# Patient Record
Sex: MALE | Race: OTHER | Employment: FULL TIME | URBAN - METROPOLITAN AREA
[De-identification: names, ages, dates, MRNs, and addresses within clinical notes are randomized per-mention and may not be internally consistent; named-entity substitution may affect disease eponyms.]

---

## 2017-01-27 PROBLEM — I51.7 CARDIOMEGALY: Status: ACTIVE | Noted: 2017-01-27

## 2017-01-27 RX ORDER — CHLORAL HYDRATE 500 MG
CAPSULE ORAL
COMMUNITY
Start: 2014-01-27

## 2017-01-27 RX ORDER — ALBUTEROL SULFATE 90 UG/1
AEROSOL, METERED RESPIRATORY (INHALATION)
COMMUNITY
Start: 2013-09-04 | End: 2017-08-08

## 2017-01-27 RX ORDER — ESOMEPRAZOLE MAGNESIUM 40 MG/1
CAPSULE, DELAYED RELEASE ORAL
COMMUNITY
Start: 2016-08-15 | End: 2020-07-24

## 2017-01-27 RX ORDER — ALPHA LIPOIC ACID 200 MG
CAPSULE ORAL
COMMUNITY
Start: 2013-09-13 | End: 2018-04-06

## 2017-01-27 RX ORDER — RANITIDINE 150 MG/1
TABLET ORAL
COMMUNITY
Start: 2016-08-01 | End: 2017-04-27

## 2017-01-27 RX ORDER — ESZOPICLONE 3 MG/1
TABLET, FILM COATED ORAL
COMMUNITY
Start: 2016-10-14 | End: 2017-01-27

## 2017-01-27 RX ORDER — ESZOPICLONE 3 MG/1
3 TABLET, FILM COATED ORAL NIGHTLY
Qty: 30 TABLET | Refills: 6 | Status: SHIPPED | OUTPATIENT
Start: 2017-01-27 | End: 2017-01-31

## 2017-01-30 NOTE — TELEPHONE ENCOUNTER
Unsure which med needs refilled. Left message for patient to call office.  James Wilkins, 01/30/2017, 5:18 PM

## 2017-01-31 NOTE — TELEPHONE ENCOUNTER
Patient needs refill of Lunesta to [facility]. Patient had px done 11/8/16 as well as labs.  Sonali Wilkins, 01/31/2017, 5:30 PM

## 2017-02-01 RX ORDER — ESZOPICLONE 3 MG/1
3 TABLET, FILM COATED ORAL NIGHTLY
Qty: 30 TABLET | Refills: 5 | Status: SHIPPED
Start: 2017-02-01 | End: 2017-05-09

## 2017-02-27 ENCOUNTER — OFFICE VISIT (OUTPATIENT)
Dept: FAMILY MEDICINE CLINIC | Facility: CLINIC | Age: 52
End: 2017-02-27

## 2017-02-27 ENCOUNTER — HOSPITAL ENCOUNTER (OUTPATIENT)
Dept: GENERAL RADIOLOGY | Age: 52
Discharge: HOME | End: 2017-02-27
Attending: NURSE PRACTITIONER
Payer: COMMERCIAL

## 2017-02-27 ENCOUNTER — TELEPHONE (OUTPATIENT)
Dept: FAMILY MEDICINE CLINIC | Facility: CLINIC | Age: 52
End: 2017-02-27

## 2017-02-27 VITALS
TEMPERATURE: 98 F | SYSTOLIC BLOOD PRESSURE: 122 MMHG | DIASTOLIC BLOOD PRESSURE: 78 MMHG | HEIGHT: 67 IN | WEIGHT: 210.19 LBS | HEART RATE: 60 BPM | BODY MASS INDEX: 32.99 KG/M2

## 2017-02-27 DIAGNOSIS — M25.532 WRIST PAIN, CHRONIC, LEFT: ICD-10-CM

## 2017-02-27 DIAGNOSIS — M25.532 WRIST PAIN, CHRONIC, LEFT: Primary | ICD-10-CM

## 2017-02-27 DIAGNOSIS — G89.29 WRIST PAIN, CHRONIC, LEFT: Primary | ICD-10-CM

## 2017-02-27 DIAGNOSIS — G89.29 WRIST PAIN, CHRONIC, LEFT: ICD-10-CM

## 2017-02-27 PROCEDURE — 73110 X-RAY EXAM OF WRIST: CPT

## 2017-02-27 PROCEDURE — 99213 OFFICE O/P EST LOW 20 MIN: CPT | Performed by: NURSE PRACTITIONER

## 2017-02-27 NOTE — PATIENT INSTRUCTIONS
Wrist Extension (Strength)     This exercise is written for your right elbow. Switch sides for your left elbow. 1. Sit in a chair. Hold a hand weight in your right hand. Your healthcare provider will tell you what size of hand weight to use.   2. Lean yo © 0945-7488 [address], [address] [place]. All rights reserved. This information is not intended as a substitute for professional medical care. Always follow your healthcare professional's instructions.         Wrist F

## 2017-02-27 NOTE — PROGRESS NOTES
HPI:    Patient ID: Alexandra Gabriel is a 45year old male. HPI    Pain to the left wrist that started 4 months ago after punching a punching bag. No swelling. States has had the problem most of his life - this time not going away.  Has seen ortho in prescriptions requested or ordered in this encounter    Imaging & Referrals:  None       #[id number]

## 2017-02-27 NOTE — TELEPHONE ENCOUNTER
----- Message from [facility], KIRSTEN sent at 2/27/2017  3:05 PM CST -----  X-ray shows possible increase in joint space in the wrist area. Recommend to try the exercises as discussed. If not improving would recommend an MRI with referral to ortho.

## 2017-03-09 ENCOUNTER — TELEPHONE (OUTPATIENT)
Dept: FAMILY MEDICINE CLINIC | Facility: CLINIC | Age: 52
End: 2017-03-09

## 2017-03-13 ENCOUNTER — TELEPHONE (OUTPATIENT)
Dept: FAMILY MEDICINE CLINIC | Facility: CLINIC | Age: 52
End: 2017-03-13

## 2017-03-13 DIAGNOSIS — M25.532 CHRONIC WRIST PAIN, LEFT: Primary | ICD-10-CM

## 2017-03-13 DIAGNOSIS — G89.29 CHRONIC WRIST PAIN, LEFT: Primary | ICD-10-CM

## 2017-03-13 NOTE — TELEPHONE ENCOUNTER
Referral amended per Lazarus Barnes at [facility] initialed changes as Dr. Jerome Goes on vacation  Referral faxed back to Winferd Oddi Minor, 03/13/2017, 12:57 PM

## 2017-03-13 NOTE — TELEPHONE ENCOUNTER
Patient saw [facility] on 2/27/17 for ongoing wrist pain  Patient states that he has been doing the PT exercises and stretches that Laurel gave him to do  States pain is not better  States he was told to call back if not better and Laurel would order MRI of the w

## 2017-03-13 NOTE — TELEPHONE ENCOUNTER
Spoke to [facility] at [employer]  Referral for oral appliance needs to be amended  She will fax over copy of referral (was done in [employer])  Need to add code  to referral for insurance    Will await form    Terrilyn Meigs Minor, 03/13/2017, 10:15

## 2017-03-20 ENCOUNTER — TELEPHONE (OUTPATIENT)
Dept: FAMILY MEDICINE CLINIC | Facility: CLINIC | Age: 52
End: 2017-03-20

## 2017-03-20 NOTE — TELEPHONE ENCOUNTER
Patient states he never heard back regarding MRI of hand  See phone note 3/13/17 regarding this  Laurel Olivares put order into chart for MRI  Unsure about status of prior authorization  Faxed order to [facility] patient scheduling  Patient has phone number to ca

## 2017-04-27 ENCOUNTER — OFFICE VISIT (OUTPATIENT)
Dept: FAMILY MEDICINE CLINIC | Facility: CLINIC | Age: 52
End: 2017-04-27

## 2017-04-27 VITALS
BODY MASS INDEX: 32.1 KG/M2 | WEIGHT: 211.81 LBS | SYSTOLIC BLOOD PRESSURE: 136 MMHG | DIASTOLIC BLOOD PRESSURE: 74 MMHG | TEMPERATURE: 97 F | HEART RATE: 60 BPM | RESPIRATION RATE: 16 BRPM | HEIGHT: 68 IN

## 2017-04-27 DIAGNOSIS — H92.01 OTALGIA, RIGHT: Primary | ICD-10-CM

## 2017-04-27 DIAGNOSIS — M79.641 PAIN OF RIGHT HAND: ICD-10-CM

## 2017-04-27 PROCEDURE — 99214 OFFICE O/P EST MOD 30 MIN: CPT | Performed by: FAMILY MEDICINE

## 2017-04-27 RX ORDER — RANITIDINE 150 MG/1
150 CAPSULE ORAL DAILY
COMMUNITY
Start: 2017-04-17 | End: 2020-07-02

## 2017-04-27 NOTE — PROGRESS NOTES
[facility] [place]  PROGRESS NOTE        HPI:   This is a 45year old male coming in for Patient presents with:  Ear Pain: Left ear has been hurting 3 days  Hand Pain: Right hand, middle and ring finger- has been hurting for 5 days    HPI    Pt Answered         Problem List:  Patient Active Problem List:     Routine general medical examination at a health care facility     Allergic rhinitis     Asthma     Low back pain     Benign enlargement of prostate     Disorder of lipid metabolism     Physic ear pain and hand pain. Diagnoses and all orders for this visit:    Otalgia, left  discussed tenderness at TMJ , needs to see DR Adriana Dawson for adjustment of oral appliance. Use CPAP x 3 nights and see if pain resolves.  And call Dami's office for evalu

## 2017-04-28 ENCOUNTER — TELEPHONE (OUTPATIENT)
Dept: FAMILY MEDICINE CLINIC | Facility: CLINIC | Age: 52
End: 2017-04-28

## 2017-04-28 NOTE — TELEPHONE ENCOUNTER
Unable to find anything in OV notes from yesterday  Was patient supposed to start using a nasal spray?   Please advise    June Wilkins, 04/28/2017, 1:26 PM

## 2017-04-28 NOTE — TELEPHONE ENCOUNTER
Patient notified of recommendations and expressed understanding.     Walter Wilkins, 04/28/2017, 2:31 PM

## 2017-05-09 ENCOUNTER — TELEPHONE (OUTPATIENT)
Dept: FAMILY MEDICINE CLINIC | Facility: CLINIC | Age: 52
End: 2017-05-09

## 2017-05-10 RX ORDER — ESZOPICLONE 3 MG/1
3 TABLET, FILM COATED ORAL NIGHTLY
Qty: 30 TABLET | Refills: 0 | Status: SHIPPED
Start: 2017-05-10 | End: 2017-06-09

## 2017-05-10 NOTE — TELEPHONE ENCOUNTER
Laurel Olivares printed and signed script  Script faxed  Patient notified    Sayra Wilkins, 05/10/2017, 11:32 AM

## 2017-06-09 RX ORDER — ESZOPICLONE 3 MG/1
TABLET, FILM COATED ORAL
Qty: 30 TABLET | Refills: 2 | Status: SHIPPED
Start: 2017-06-09 | End: 2018-04-06

## 2017-06-09 NOTE — TELEPHONE ENCOUNTER
4/27/17 last OV with Dr. Didier Lopez  Was not originally for sleep but sleep was discussed    June Wilkins, 06/09/2017, 3:32 PM

## 2017-06-09 NOTE — TELEPHONE ENCOUNTER
Script printed, signed, and faxed ([facility])    Sonali Wilkins, 06/09/2017, 3:44 PM

## 2017-07-18 ENCOUNTER — OFFICE VISIT (OUTPATIENT)
Dept: FAMILY MEDICINE CLINIC | Facility: CLINIC | Age: 52
End: 2017-07-18

## 2017-07-18 VITALS
TEMPERATURE: 98 F | WEIGHT: 210.63 LBS | SYSTOLIC BLOOD PRESSURE: 122 MMHG | BODY MASS INDEX: 31.92 KG/M2 | HEIGHT: 68 IN | HEART RATE: 58 BPM | DIASTOLIC BLOOD PRESSURE: 76 MMHG | RESPIRATION RATE: 16 BRPM

## 2017-07-18 DIAGNOSIS — G47.33 OSA (OBSTRUCTIVE SLEEP APNEA): Primary | ICD-10-CM

## 2017-07-18 DIAGNOSIS — G47.09 OTHER INSOMNIA: ICD-10-CM

## 2017-07-18 DIAGNOSIS — G47.33 OBSTRUCTIVE SLEEP APNEA: ICD-10-CM

## 2017-07-18 DIAGNOSIS — G47.10 HYPERSOMNIA: ICD-10-CM

## 2017-07-18 PROCEDURE — 99214 OFFICE O/P EST MOD 30 MIN: CPT | Performed by: FAMILY MEDICINE

## 2017-07-18 NOTE — PROGRESS NOTES
[facility]  [facility]        HPI:   This is a 47year old male coming in for Patient presents with:  Obstructive Sleep Apnea (CRYSTAL): Sleep Follow up      HPI:  Has been using oral appliance and didn't feel comfortable with cpap. FSS 21         Past Medical History:   Diagnosis Date   • Asthma    • Sleep apnea      Past Surgical History:  No date: OTHER      Comment: L shoulder   No date: REPAIR, SLAP LESION      Comment: Left shoulder  No date: SINUS SURGERY    Social History: Respiratory: Positive for apnea. Cardiovascular: Negative. Neurological:        RLS scale: 0   Psychiatric/Behavioral: Positive for sleep disturbance.        EXAM:   /76 (BP Location: Left arm, Patient Position: Sitting, Cuff Size: large)   Pu psg with appliance. 2. Hypersomnia  - SLEEP MEDICINE - EXTERNAL    3. Other insomnia    Recommend staying in one time zone x 3 months. Recommend CBT-I for sleep. [facility]. Follow up with marcella Fu.        Ther

## 2017-07-27 ENCOUNTER — TELEPHONE (OUTPATIENT)
Dept: FAMILY MEDICINE CLINIC | Facility: CLINIC | Age: 52
End: 2017-07-27

## 2017-07-31 NOTE — TELEPHONE ENCOUNTER
Order for sleep study in patient's chart dated 7/18/17  Patient states he has not heard from [facility] sleep lab to schedule  Gave patient contact number to call to [facility] sleep lab  Order in chart for sleep study refaxed to [facility]  Patient to call tomorrow to sche

## 2017-08-08 ENCOUNTER — TELEPHONE (OUTPATIENT)
Dept: FAMILY MEDICINE CLINIC | Facility: CLINIC | Age: 52
End: 2017-08-08

## 2017-08-08 RX ORDER — ALBUTEROL SULFATE 90 UG/1
1-2 AEROSOL, METERED RESPIRATORY (INHALATION)
Qty: 1 INHALER | Refills: 3 | Status: SHIPPED | OUTPATIENT
Start: 2017-08-08 | End: 2017-10-31

## 2017-08-15 ENCOUNTER — TELEPHONE (OUTPATIENT)
Dept: FAMILY MEDICINE CLINIC | Facility: CLINIC | Age: 52
End: 2017-08-15

## 2017-08-15 NOTE — TELEPHONE ENCOUNTER
Sleep study prior authorization information faxed to [facility] at [address], 08/15/17, 11:45 AM

## 2017-08-22 PROCEDURE — 95810 POLYSOM 6/> YRS 4/> PARAM: CPT | Performed by: FAMILY MEDICINE

## 2017-08-23 PROCEDURE — 95805 MULTIPLE SLEEP LATENCY TEST: CPT | Performed by: FAMILY MEDICINE

## 2017-08-31 ENCOUNTER — SLEEP STUDY (OUTPATIENT)
Dept: FAMILY MEDICINE CLINIC | Facility: CLINIC | Age: 52
End: 2017-08-31

## 2017-08-31 ENCOUNTER — APPOINTMENT (OUTPATIENT)
Dept: FAMILY MEDICINE CLINIC | Facility: CLINIC | Age: 52
End: 2017-08-31

## 2017-08-31 DIAGNOSIS — G47.20 CIRCADIAN RHYTHM SLEEP DISORDER OF NONORGANIC ORIGIN: ICD-10-CM

## 2017-08-31 DIAGNOSIS — G47.13 HYPERSOMNIA, RECURRENT: Primary | ICD-10-CM

## 2017-08-31 DIAGNOSIS — G47.33 OBSTRUCTIVE SLEEP APNEA SYNDROME: Primary | ICD-10-CM

## 2017-08-31 DIAGNOSIS — F51.8 CIRCADIAN RHYTHM SLEEP DISORDER OF NONORGANIC ORIGIN: ICD-10-CM

## 2017-09-05 ENCOUNTER — HOSPITAL ENCOUNTER (OUTPATIENT)
Dept: GENERAL RADIOLOGY | Age: 52
Discharge: HOME | End: 2017-09-05
Attending: FAMILY MEDICINE
Payer: COMMERCIAL

## 2017-09-05 ENCOUNTER — OFFICE VISIT (OUTPATIENT)
Dept: FAMILY MEDICINE CLINIC | Facility: CLINIC | Age: 52
End: 2017-09-05

## 2017-09-05 VITALS
BODY MASS INDEX: 32.49 KG/M2 | TEMPERATURE: 98 F | DIASTOLIC BLOOD PRESSURE: 72 MMHG | RESPIRATION RATE: 16 BRPM | WEIGHT: 214.38 LBS | HEART RATE: 68 BPM | HEIGHT: 68 IN | SYSTOLIC BLOOD PRESSURE: 132 MMHG

## 2017-09-05 DIAGNOSIS — M25.561 ACUTE PAIN OF RIGHT KNEE: ICD-10-CM

## 2017-09-05 DIAGNOSIS — G47.33 OBSTRUCTIVE SLEEP APNEA: Primary | ICD-10-CM

## 2017-09-05 PROCEDURE — 99214 OFFICE O/P EST MOD 30 MIN: CPT | Performed by: FAMILY MEDICINE

## 2017-09-05 PROCEDURE — 73560 X-RAY EXAM OF KNEE 1 OR 2: CPT | Performed by: FAMILY MEDICINE

## 2017-09-05 NOTE — PROGRESS NOTES
[facility]  SLE[facility]ESS [facility]        HPI:   This is a 47year old male coming in for Patient presents with: Follow - Up: Sleep - does not have card / pt has not been using CPAP  Knee Pain: Right knee      1.   Patient here for follow u beer: 10 per week    Family History:  Family History   Problem Relation Age of Onset   • Cancer Father    • Arthritis Father      Allergies:    No Known Allergies        Current Meds:    Current Outpatient Prescriptions:  Albuterol Sulfate HFA (PROAIR HFA) 32.60 kg/m²  Estimated body mass index is 32.6 kg/m² as calculated from the following:    Height as of this encounter: 68\". Weight as of this encounter: 214 lb 6 oz. Neck in inches:       Wt Readings from Last 6 Encounters:  09/05/17 : 214 lb 6 oz  07 slowly return to full duty. Would seem to be reasonable to cross 1-2 time zones at this time. Recommend to d/c all sleep meds. Recommend MSLT  Shows that patient is not excessively sleepy and safe for duty.    However also shows that patient is not com

## 2017-09-06 ENCOUNTER — TELEPHONE (OUTPATIENT)
Dept: FAMILY MEDICINE CLINIC | Facility: CLINIC | Age: 52
End: 2017-09-06

## 2017-09-06 NOTE — TELEPHONE ENCOUNTER
Below faxed as requested. Radiology informed to make a copy of xrays. Patient informed and will  copy in the next day or two.  Roderick Platt, 09/06/17, 8:49 AM

## 2017-09-06 NOTE — TELEPHONE ENCOUNTER
----- Message from Lyle Connor MD sent at 9/5/2017  5:54 PM CDT -----  Fax to Dr. Darin Little with referral.   Have radiology copy films.

## 2017-09-07 ENCOUNTER — TELEPHONE (OUTPATIENT)
Dept: FAMILY MEDICINE CLINIC | Facility: CLINIC | Age: 52
End: 2017-09-07

## 2017-09-29 ENCOUNTER — TELEPHONE (OUTPATIENT)
Dept: FAMILY MEDICINE CLINIC | Facility: CLINIC | Age: 52
End: 2017-09-29

## 2017-09-29 NOTE — TELEPHONE ENCOUNTER
Dr Derik Carlson is calling about patient stating that he has titrated the patient as much as he can. If you need to speak to him please call [phone] on Monday. No other questions at this time.

## 2017-10-05 ENCOUNTER — TELEPHONE (OUTPATIENT)
Dept: FAMILY MEDICINE CLINIC | Facility: CLINIC | Age: 52
End: 2017-10-05

## 2017-10-05 ENCOUNTER — OFFICE VISIT (OUTPATIENT)
Dept: FAMILY MEDICINE CLINIC | Facility: CLINIC | Age: 52
End: 2017-10-05

## 2017-10-05 VITALS
TEMPERATURE: 98 F | HEART RATE: 64 BPM | SYSTOLIC BLOOD PRESSURE: 128 MMHG | RESPIRATION RATE: 16 BRPM | DIASTOLIC BLOOD PRESSURE: 82 MMHG

## 2017-10-05 DIAGNOSIS — G47.09 OTHER INSOMNIA: Primary | ICD-10-CM

## 2017-10-05 DIAGNOSIS — G47.33 OBSTRUCTIVE SLEEP APNEA: ICD-10-CM

## 2017-10-05 PROCEDURE — 99214 OFFICE O/P EST MOD 30 MIN: CPT | Performed by: FAMILY MEDICINE

## 2017-10-05 NOTE — TELEPHONE ENCOUNTER
Spoke with patient verifying appointment for today. Patient states he has an appointment at 4 pm today, was rescheduled from Tuesday. Thank you.  ALLAN BRAMBILA, 10/05/17, 3:32 PM

## 2017-10-05 NOTE — PROGRESS NOTES
[facility] [place]  PROGRESS NOTE        HPI:   This is a 47year old male coming in for Patient presents with:  Obstructive Sleep Apnea (CRYSTAL): Follow up for sleep study    HPI is here for follow-up of sleep apnea.   He had his oral appliance fur 4 to 6 hours as needed for Wheezing.  Disp: 1 Inhaler Rfl: 3   ESZOPICLONE 3 MG Oral Tab TAKE 1 TABLET BY MOUTH IN THE EVENING Disp: 30 tablet Rfl: 2      Counseling given: Not Answered         Problem List:  Patient Active Problem List:     Routine general normal   Neurological: He is alert and oriented to person, place, and time. He has normal reflexes. Skin: Skin is warm and dry. Psychiatric: He has a normal mood and affect.  His behavior is normal. Judgment and thought content normal.       ASSESSMENT no immunization history on file for this patient.

## 2017-10-06 ENCOUNTER — TELEPHONE (OUTPATIENT)
Dept: FAMILY MEDICINE CLINIC | Facility: CLINIC | Age: 52
End: 2017-10-06

## 2017-10-06 NOTE — TELEPHONE ENCOUNTER
Regarding paperwork that was faxed to his employer from Dr. Luiz Romano  Patient states on page 2 where it asks if there are any restrictions that Dr. Luiz Romano answered \"Yes\"  However, no restrictions were given  Patient states that when he discussed with Dr. Luiz Romano

## 2017-10-31 ENCOUNTER — OFFICE VISIT (OUTPATIENT)
Dept: FAMILY MEDICINE CLINIC | Facility: CLINIC | Age: 52
End: 2017-10-31

## 2017-10-31 VITALS
TEMPERATURE: 97 F | WEIGHT: 219 LBS | BODY MASS INDEX: 33.19 KG/M2 | SYSTOLIC BLOOD PRESSURE: 122 MMHG | RESPIRATION RATE: 16 BRPM | HEIGHT: 68 IN | HEART RATE: 60 BPM | DIASTOLIC BLOOD PRESSURE: 68 MMHG

## 2017-10-31 DIAGNOSIS — E78.00 PURE HYPERCHOLESTEROLEMIA: ICD-10-CM

## 2017-10-31 DIAGNOSIS — Z23 NEED FOR IMMUNIZATION AGAINST INFLUENZA: ICD-10-CM

## 2017-10-31 DIAGNOSIS — D72.829 LEUKOCYTOSIS, UNSPECIFIED TYPE: ICD-10-CM

## 2017-10-31 DIAGNOSIS — E53.8 ADENOSYLCOBALAMIN SYNTHESIS DEFECT: ICD-10-CM

## 2017-10-31 DIAGNOSIS — E55.9 VITAMIN D DEFICIENCY: ICD-10-CM

## 2017-10-31 DIAGNOSIS — Z00.00 ROUTINE GENERAL MEDICAL EXAMINATION AT A HEALTH CARE FACILITY: Primary | ICD-10-CM

## 2017-10-31 DIAGNOSIS — E78.9 DISORDER OF LIPID METABOLISM: ICD-10-CM

## 2017-10-31 PROCEDURE — 90686 IIV4 VACC NO PRSV 0.5 ML IM: CPT | Performed by: FAMILY MEDICINE

## 2017-10-31 PROCEDURE — 90471 IMMUNIZATION ADMIN: CPT | Performed by: FAMILY MEDICINE

## 2017-10-31 PROCEDURE — 82272 OCCULT BLD FECES 1-3 TESTS: CPT | Performed by: FAMILY MEDICINE

## 2017-10-31 PROCEDURE — 99396 PREV VISIT EST AGE 40-64: CPT | Performed by: FAMILY MEDICINE

## 2017-10-31 PROCEDURE — 93000 ELECTROCARDIOGRAM COMPLETE: CPT | Performed by: FAMILY MEDICINE

## 2017-10-31 RX ORDER — ALBUTEROL SULFATE 90 UG/1
1-2 AEROSOL, METERED RESPIRATORY (INHALATION)
Qty: 1 INHALER | Refills: 3 | Status: SHIPPED | OUTPATIENT
Start: 2017-10-31 | End: 2018-11-26

## 2017-10-31 NOTE — PROGRESS NOTES
[facility] [place]  PROGRESS NOTE        HPI:   This is a 47year old male coming in for Patient presents with:  Physical: Annual Physical    HPI  Is here for his routine physical overall he is doing very well he has no new concerns.   He is taki at a health care facility     Allergic rhinitis     Asthma     Low back pain     Benign enlargement of prostate     Disorder of lipid metabolism     Pain in joint, pelvic region and thigh     Insomnia     Pain in joint, lower leg     Leukocytosis     Cardi Cardiovascular: Normal rate, regular rhythm and normal heart sounds. Pulmonary/Chest: Effort normal and breath sounds normal.   Abdominal: Soft.  Bowel sounds are normal.   Genitourinary: Rectum normal and penis normal. Rectal exam shows guaiac negativ IRON AND TIBC; Future  -     LIPID PANEL;  Future  -     PSA SCREEN; Future  -     TSH W REFLEX TO FREE T4; Future  -     URINALYSIS WITH CULTURE REFLEX; Future  -     URIC ACID, SERUM; Future  -     VITAMIN B12; Future  -     VITAMIN D, 25-HYDROXY; Future CK CREATINE KINASE (NOT CREATININE); Future  -     COMP METABOLIC PANEL (14); Future  -     FERRITIN; Future  -     IRON AND TIBC; Future  -     LIPID PANEL;  Future  -     PSA SCREEN; Future  -     TSH W REFLEX TO FREE T4; Future  -     URINALYSIS WITH History   Administered Date(s) Administered   • Influenza 11/08/2016   • TD 04/07/2005   • TDAP 10/02/2014   • Tb Intradermal Test 09/09/2013       Most Recent Immunizations  Administered            Date(s) Administered    Influenza             11/08/2016

## 2017-11-04 ENCOUNTER — LABORATORY ENCOUNTER (OUTPATIENT)
Dept: LAB | Age: 52
End: 2017-11-04
Attending: FAMILY MEDICINE
Payer: COMMERCIAL

## 2017-11-04 DIAGNOSIS — Z00.00 ROUTINE GENERAL MEDICAL EXAMINATION AT A HEALTH CARE FACILITY: ICD-10-CM

## 2017-11-04 DIAGNOSIS — E55.9 VITAMIN D DEFICIENCY: ICD-10-CM

## 2017-11-04 DIAGNOSIS — D72.829 LEUKOCYTOSIS, UNSPECIFIED TYPE: ICD-10-CM

## 2017-11-04 DIAGNOSIS — E78.9 DISORDER OF LIPID METABOLISM: ICD-10-CM

## 2017-11-04 DIAGNOSIS — E53.8 ADENOSYLCOBALAMIN SYNTHESIS DEFECT: ICD-10-CM

## 2017-11-04 DIAGNOSIS — E78.00 PURE HYPERCHOLESTEROLEMIA: ICD-10-CM

## 2017-11-04 PROCEDURE — 82728 ASSAY OF FERRITIN: CPT | Performed by: FAMILY MEDICINE

## 2017-11-04 PROCEDURE — 82550 ASSAY OF CK (CPK): CPT | Performed by: FAMILY MEDICINE

## 2017-11-04 PROCEDURE — 81003 URINALYSIS AUTO W/O SCOPE: CPT | Performed by: FAMILY MEDICINE

## 2017-11-04 PROCEDURE — 84153 ASSAY OF PSA TOTAL: CPT | Performed by: FAMILY MEDICINE

## 2017-11-04 PROCEDURE — 83550 IRON BINDING TEST: CPT | Performed by: FAMILY MEDICINE

## 2017-11-04 PROCEDURE — 82607 VITAMIN B-12: CPT | Performed by: FAMILY MEDICINE

## 2017-11-04 PROCEDURE — 83540 ASSAY OF IRON: CPT | Performed by: FAMILY MEDICINE

## 2017-11-04 PROCEDURE — 80050 GENERAL HEALTH PANEL: CPT | Performed by: FAMILY MEDICINE

## 2017-11-04 PROCEDURE — 84550 ASSAY OF BLOOD/URIC ACID: CPT | Performed by: FAMILY MEDICINE

## 2017-11-04 PROCEDURE — 82306 VITAMIN D 25 HYDROXY: CPT | Performed by: FAMILY MEDICINE

## 2017-11-04 PROCEDURE — 80061 LIPID PANEL: CPT | Performed by: FAMILY MEDICINE

## 2017-11-04 PROCEDURE — 36415 COLL VENOUS BLD VENIPUNCTURE: CPT | Performed by: FAMILY MEDICINE

## 2017-11-06 ENCOUNTER — TELEPHONE (OUTPATIENT)
Dept: FAMILY MEDICINE CLINIC | Facility: CLINIC | Age: 52
End: 2017-11-06

## 2017-11-06 DIAGNOSIS — E55.9 VITAMIN D DEFICIENCY: Primary | ICD-10-CM

## 2017-11-06 DIAGNOSIS — R73.9 HYPERGLYCEMIA: ICD-10-CM

## 2017-11-06 DIAGNOSIS — E78.5 HYPERLIPIDEMIA, UNSPECIFIED HYPERLIPIDEMIA TYPE: ICD-10-CM

## 2017-11-06 NOTE — TELEPHONE ENCOUNTER
We received a medical records release request from Alexandra Dominguez to help pay 8/31/17 claim for Dr Juan J Poe. [facility] requested the sleep study report. I printed 8/22/17 & 8/23/17 sleep report and faxed to Alexandra Dominguez @ [phone].

## 2017-11-06 NOTE — TELEPHONE ENCOUNTER
----- Message from Cecilia Hawkins MD sent at 11/6/2017  9:20 AM CST -----  Vitamin D is sub therapeutic. Recommend 2000 units of vitamin D daily  Recheck vitamin D level in 6  months. cholesterol are elevated.   Recommend attention to diet and exercise

## 2017-11-09 NOTE — TELEPHONE ENCOUNTER
Patient is active on [address] message sent to patient with results and recommendations    Nixon Wilkins, 11/09/17, 2:28 PM

## 2017-12-19 NOTE — TELEPHONE ENCOUNTER
Patient called back for lab results / recommendations    Patient notified of results and recommendations and expressed understanding.   Patient states he does not want to start statin medication at this time  States he will try lifestyle changes and herbal

## 2018-03-21 ENCOUNTER — TELEPHONE (OUTPATIENT)
Dept: FAMILY MEDICINE CLINIC | Facility: CLINIC | Age: 53
End: 2018-03-21

## 2018-03-21 NOTE — TELEPHONE ENCOUNTER
We received a medical records release request from [employer] to help pay 8/23/17 claim for Dr Jacky Meade. [facility] requested the sleep study report. I printed 8/22/17 sleep study report and faxed it to [phone].

## 2018-04-06 ENCOUNTER — OFFICE VISIT (OUTPATIENT)
Dept: FAMILY MEDICINE CLINIC | Facility: CLINIC | Age: 53
End: 2018-04-06

## 2018-04-06 VITALS
DIASTOLIC BLOOD PRESSURE: 68 MMHG | BODY MASS INDEX: 33.65 KG/M2 | RESPIRATION RATE: 14 BRPM | SYSTOLIC BLOOD PRESSURE: 118 MMHG | HEIGHT: 68 IN | TEMPERATURE: 98 F | HEART RATE: 80 BPM | WEIGHT: 222 LBS

## 2018-04-06 DIAGNOSIS — G47.09 OTHER INSOMNIA: Primary | ICD-10-CM

## 2018-04-06 PROCEDURE — 99213 OFFICE O/P EST LOW 20 MIN: CPT | Performed by: NURSE PRACTITIONER

## 2018-04-06 RX ORDER — DOXEPIN HYDROCHLORIDE 3 MG/1
1 TABLET ORAL NIGHTLY PRN
Qty: 30 TABLET | Refills: 0 | Status: SHIPPED | OUTPATIENT
Start: 2018-04-06 | End: 2018-05-01

## 2018-04-06 NOTE — PATIENT INSTRUCTIONS
Trial of selanor 3 mg. Informed patient if not helping, increase to 6 mg. Call next week with an update.

## 2018-04-06 NOTE — PROGRESS NOTES
[facility]  SLE[facility]        HPI:   This is a 47year old male coming in for Patient presents with: Insomnia      HPI:     Not sleeping through the night. Is still flying a lot with different time zones.  States always in the Smokeless tobacco: Never Used                      Alcohol use: Yes           6.0 oz/week     Cans of beer: 10 per week    Family History:  Family History   Problem Relation Age of Onset   • Cancer Father    • Jefe large)   Pulse 80   Temp 98.1 °F (36.7 °C) (Tympanic)   Resp 14   Ht 68\"   Wt 222 lb   BMI 33.75 kg/m²  Estimated body mass index is 33.75 kg/m² as calculated from the following:    Height as of this encounter: 68\".     Weight as of this encounter: 222 lb chamber changed every 6 month  with the [employer] medical [employer] provider. Meds & Refills for this Visit:  Signed Prescriptions Disp Refills    Doxepin HCl (SILENOR) 3 MG Oral Tab 30 tablet 0      Sig: Take 1 tablet by mouth nightly as needed.

## 2018-05-01 RX ORDER — DOXEPIN HYDROCHLORIDE 3 MG/1
1 TABLET ORAL NIGHTLY PRN
Qty: 90 TABLET | Refills: 0 | Status: SHIPPED | OUTPATIENT
Start: 2018-05-01 | End: 2018-05-02

## 2018-05-01 NOTE — TELEPHONE ENCOUNTER
Please clarify with patient that the medication is helping. If it is, will give refill. Thank you.  RADHA Lugo, FNP-BC  5/1/2018  9:04 AM

## 2018-05-01 NOTE — TELEPHONE ENCOUNTER
Future appt:    Last Appointment:  4/6/2018; No f/u recommended    Cholesterol, Total (mg/dL)   Date Value   11/04/2017 237 (H)   ----------  HDL Cholesterol (mg/dL)   Date Value   11/04/2017 62   ----------  LDL Cholesterol (mg/dL)   Date Value   11/04/20

## 2018-05-01 NOTE — TELEPHONE ENCOUNTER
Patient feels the medication is helping; at least enough that he would like to keep trying it. Informed patient a RF will be given. Please advise.

## 2018-05-02 RX ORDER — DOXEPIN HYDROCHLORIDE 3 MG/1
1 TABLET ORAL NIGHTLY PRN
Qty: 90 TABLET | Refills: 0 | Status: SHIPPED | OUTPATIENT
Start: 2018-05-02 | End: 2018-06-01

## 2018-05-02 NOTE — TELEPHONE ENCOUNTER
RF of Doxepin given yesterday to mail order by mistake. Was to be faxed to Josesito Kwok. Please advise.

## 2018-05-11 ENCOUNTER — TELEPHONE (OUTPATIENT)
Dept: FAMILY MEDICINE CLINIC | Facility: CLINIC | Age: 53
End: 2018-05-11

## 2018-05-11 NOTE — TELEPHONE ENCOUNTER
We received a request for medical records request from [facility] for a copy of patient's CRYSTAL visits and Sleep STudy reports for the period of 11/23/17 to 8/22/17.  This request was sent to [employer]

## 2018-06-20 NOTE — TELEPHONE ENCOUNTER
Future appt:      Pt had sleep appt w/ CS on 4/6/18. Med was refilled back on 5/2/18 for #90  However pt is requesting mail order pharmacy. Last px w/ Dr. Jacky Meade:  10/31/17.       Cholesterol, Total (mg/dL)   Date Value   11/04/2017 237 (H)   ----------  H

## 2018-06-23 RX ORDER — DOXEPIN HYDROCHLORIDE 3 MG/1
3 TABLET ORAL NIGHTLY PRN
Qty: 90 TABLET | Refills: 0 | Status: SHIPPED | OUTPATIENT
Start: 2018-06-23 | End: 2018-07-23

## 2018-08-27 RX ORDER — DOXEPIN HYDROCHLORIDE 3 MG/1
TABLET ORAL
Qty: 90 TABLET | Refills: 0 | Status: SHIPPED | OUTPATIENT
Start: 2018-08-27 | End: 2018-09-27

## 2018-08-27 NOTE — TELEPHONE ENCOUNTER
One refill done. Patient due for 6 month recheck before more refills. Please let patient know. Thank you.  Laurel Olivares, 08/27/18, 1:58 PM

## 2018-09-27 ENCOUNTER — OFFICE VISIT (OUTPATIENT)
Dept: FAMILY MEDICINE CLINIC | Facility: CLINIC | Age: 53
End: 2018-09-27
Payer: COMMERCIAL

## 2018-09-27 VITALS
RESPIRATION RATE: 16 BRPM | HEIGHT: 68 IN | WEIGHT: 222.38 LBS | DIASTOLIC BLOOD PRESSURE: 80 MMHG | SYSTOLIC BLOOD PRESSURE: 112 MMHG | TEMPERATURE: 97 F | BODY MASS INDEX: 33.7 KG/M2 | HEART RATE: 68 BPM

## 2018-09-27 DIAGNOSIS — G47.09 OTHER INSOMNIA: Primary | ICD-10-CM

## 2018-09-27 PROCEDURE — 99213 OFFICE O/P EST LOW 20 MIN: CPT | Performed by: NURSE PRACTITIONER

## 2018-09-27 RX ORDER — DOXEPIN HYDROCHLORIDE 10 MG/1
10 CAPSULE ORAL NIGHTLY
Qty: 90 CAPSULE | Refills: 0 | Status: SHIPPED | OUTPATIENT
Start: 2018-09-27 | End: 2018-11-26

## 2018-09-27 NOTE — PATIENT INSTRUCTIONS
Change to doxepin 10 mg.   Recheck in November for a physical with Dr. Adolph Vargas. Otherwise follow-up as needed.

## 2018-09-27 NOTE — PROGRESS NOTES
[facility]  [facility]        HPI:   This is a 46year old male coming in for Patient presents with:  Medication Follow-Up: F/u on Silenor       HPI:     Patient is present to review his sleep.   States that he is not sleeping we Alcohol/week: 6.0 oz      Types: 10 Cans of beer per week    Drug use: No    Family History:  Family History   Problem Relation Age of Onset   • Cancer Father    • Arthritis Father    • Depression Daughter    • Asthma Son    • Asthma Daughter      Maurodominick Monterrosova Resp 16   Ht 68\"   Wt 222 lb 6.4 oz   BMI 33.82 kg/m²  Estimated body mass index is 33.82 kg/m² as calculated from the following:    Height as of this encounter: 68\". Weight as of this encounter: 222 lb 6.4 oz. Neck in inches:       Wt Readings from humidifier  chamber changed every 6 month  with the Durable medical equipment provider.          Meds & Refills for this Visit:  Requested Prescriptions     Signed Prescriptions Disp Refills   • Doxepin HCl 10 MG Oral Cap 90 capsule 0     Sig: Take 1 capsul

## 2018-09-28 ENCOUNTER — TELEPHONE (OUTPATIENT)
Dept: FAMILY MEDICINE CLINIC | Facility: CLINIC | Age: 53
End: 2018-09-28

## 2018-09-28 DIAGNOSIS — E53.8 LOW SERUM VITAMIN B12: Primary | ICD-10-CM

## 2018-09-28 NOTE — TELEPHONE ENCOUNTER
physical and bloodwork appointments set, bloodwork for 11-07 and physical for 11-26, order needed for bloodwork?

## 2018-09-28 NOTE — TELEPHONE ENCOUNTER
Your appointments     Date & Time Appointment Department [facility])    Nov 07, 2018  8:15 AM CST Laboratory Visit with [facility] ([facility])    Nov 26, 2018  2:00 PM CST Physical - Established Patient with Zarina Powers MD

## 2018-11-07 ENCOUNTER — LABORATORY ENCOUNTER (OUTPATIENT)
Dept: LAB | Age: 53
End: 2018-11-07
Attending: FAMILY MEDICINE
Payer: COMMERCIAL

## 2018-11-07 DIAGNOSIS — E55.9 VITAMIN D DEFICIENCY: ICD-10-CM

## 2018-11-07 DIAGNOSIS — E53.8 LOW SERUM VITAMIN B12: ICD-10-CM

## 2018-11-07 DIAGNOSIS — R73.9 HYPERGLYCEMIA: ICD-10-CM

## 2018-11-07 DIAGNOSIS — E78.5 HYPERLIPIDEMIA, UNSPECIFIED HYPERLIPIDEMIA TYPE: ICD-10-CM

## 2018-11-07 PROCEDURE — 82306 VITAMIN D 25 HYDROXY: CPT | Performed by: FAMILY MEDICINE

## 2018-11-07 PROCEDURE — 82607 VITAMIN B-12: CPT | Performed by: FAMILY MEDICINE

## 2018-11-07 PROCEDURE — 80053 COMPREHEN METABOLIC PANEL: CPT | Performed by: FAMILY MEDICINE

## 2018-11-07 PROCEDURE — 36415 COLL VENOUS BLD VENIPUNCTURE: CPT | Performed by: FAMILY MEDICINE

## 2018-11-07 PROCEDURE — 80061 LIPID PANEL: CPT | Performed by: FAMILY MEDICINE

## 2018-11-08 NOTE — PROGRESS NOTES
Pt with ok vitamin d and  b12 is a bit low so could intermittently supplement. cholesterol is a bit high. Will discuss at physical.   [employer] Message sent.

## 2018-11-26 ENCOUNTER — OFFICE VISIT (OUTPATIENT)
Dept: FAMILY MEDICINE CLINIC | Facility: CLINIC | Age: 53
End: 2018-11-26
Payer: COMMERCIAL

## 2018-11-26 VITALS
HEIGHT: 68 IN | DIASTOLIC BLOOD PRESSURE: 76 MMHG | TEMPERATURE: 98 F | HEART RATE: 80 BPM | BODY MASS INDEX: 33.34 KG/M2 | SYSTOLIC BLOOD PRESSURE: 122 MMHG | RESPIRATION RATE: 18 BRPM | WEIGHT: 220 LBS

## 2018-11-26 DIAGNOSIS — Z12.11 SCREENING FOR COLON CANCER: ICD-10-CM

## 2018-11-26 DIAGNOSIS — E53.8 ADENOSYLCOBALAMIN SYNTHESIS DEFECT: ICD-10-CM

## 2018-11-26 DIAGNOSIS — Z00.00 ROUTINE GENERAL MEDICAL EXAMINATION AT A HEALTH CARE FACILITY: ICD-10-CM

## 2018-11-26 DIAGNOSIS — E78.00 PURE HYPERCHOLESTEROLEMIA: ICD-10-CM

## 2018-11-26 DIAGNOSIS — E55.9 VITAMIN D DEFICIENCY: ICD-10-CM

## 2018-11-26 DIAGNOSIS — Z23 NEED FOR INFLUENZA VACCINATION: ICD-10-CM

## 2018-11-26 DIAGNOSIS — E78.9 DISORDER OF LIPID METABOLISM: ICD-10-CM

## 2018-11-26 DIAGNOSIS — D72.829 LEUKOCYTOSIS, UNSPECIFIED TYPE: ICD-10-CM

## 2018-11-26 DIAGNOSIS — Z12.5 SCREENING FOR PROSTATE CANCER: ICD-10-CM

## 2018-11-26 DIAGNOSIS — J32.0 CHRONIC MAXILLARY SINUSITIS: Primary | ICD-10-CM

## 2018-11-26 PROBLEM — M21.41 FLAT FEET, BILATERAL: Status: ACTIVE | Noted: 2018-11-26

## 2018-11-26 PROBLEM — M21.42 FLAT FEET, BILATERAL: Status: ACTIVE | Noted: 2018-11-26

## 2018-11-26 PROCEDURE — 90686 IIV4 VACC NO PRSV 0.5 ML IM: CPT | Performed by: FAMILY MEDICINE

## 2018-11-26 PROCEDURE — 82272 OCCULT BLD FECES 1-3 TESTS: CPT | Performed by: FAMILY MEDICINE

## 2018-11-26 PROCEDURE — 90471 IMMUNIZATION ADMIN: CPT | Performed by: FAMILY MEDICINE

## 2018-11-26 PROCEDURE — 99396 PREV VISIT EST AGE 40-64: CPT | Performed by: FAMILY MEDICINE

## 2018-11-26 RX ORDER — DOXEPIN HYDROCHLORIDE 10 MG/1
10 CAPSULE ORAL NIGHTLY
Qty: 90 CAPSULE | Refills: 1 | Status: SHIPPED | OUTPATIENT
Start: 2018-11-26 | End: 2019-01-24

## 2018-11-26 RX ORDER — LORATADINE 10 MG/1
CAPSULE, LIQUID FILLED ORAL
COMMUNITY
End: 2020-01-10

## 2018-11-26 RX ORDER — ALBUTEROL SULFATE 90 UG/1
1-2 AEROSOL, METERED RESPIRATORY (INHALATION)
Qty: 1 INHALER | Refills: 3 | Status: SHIPPED | OUTPATIENT
Start: 2018-11-26 | End: 2019-04-23

## 2018-11-26 NOTE — PROGRESS NOTES
[facility] [place]  PROGRESS NOTE        HPI:   This is a 46year old male coming in for Patient presents with:  Physical    HPI   Pt is here for his physical today,   However he notes that he feels sick since Saturday night  He has had sneezing Social History Narrative      Lives at home with wife. Children:  3. All grown. Works a lot. Wife to make decisions if unable. Full code.      Social History    Tobacco Use      Smoking status: Never Smoker      Smokeless tobac Vitamin D deficiency     Flat feet, bilateral    The 10-year ASCVD risk score (Lily Mcpherson, et al., 2013) is: 6.1%    Values used to calculate the score:      Age: 46 years      Sex: Male      Is Non- : No      Diabetic: No      Tob regular rhythm, normal heart sounds and intact distal pulses. Pulmonary/Chest: Effort normal and breath sounds normal. No stridor. Abdominal: Soft. Bowel sounds are normal.   Genitourinary: Rectum normal. Rectal exam shows guaiac negative stool.  No pen (PROAIR HFA) 108 (90 Base) MCG/ACT Inhalation Aero Soln; Inhale 1-2 puffs into the lungs every 4 to 6 hours as needed for Wheezing. Pure hypercholesterolemia  -     Albuterol Sulfate HFA (PROAIR HFA) 108 (90 Base) MCG/ACT Inhalation Aero Soln;  Inhale 1- Most Recent Immunizations  Administered            Date(s) Administered    FLUZONE 3 Yrs+ Quad Prsv Free 0.5 ml (29397)                          10/31/2017      Influenza             11/08/2016      TD                    04/07/2005      TDAP

## 2019-01-09 RX ORDER — DOXEPIN HYDROCHLORIDE 10 MG/1
CAPSULE ORAL
Qty: 90 CAPSULE | Refills: 0 | OUTPATIENT
Start: 2019-01-09

## 2019-01-09 NOTE — TELEPHONE ENCOUNTER
Received e [employer]scribe refill request of doxepin. Doxepin prescribed by Dr. Kerri Gallo for #90 with 1 refill 11/26/18. Patient should not need refill at this time. Medication denied with the above notation.

## 2019-01-11 ENCOUNTER — TELEPHONE (OUTPATIENT)
Dept: FAMILY MEDICINE CLINIC | Facility: CLINIC | Age: 54
End: 2019-01-11

## 2019-01-11 NOTE — TELEPHONE ENCOUNTER
Patient states at his last OV with Dr. Ramirez April 11/26/18 that she was going to do a script for him to get new orthotics  Patient is calling regarding that script - states he has not heard or received anything  Ok to wait for Dr. Ramirez April on Monday    Toñito Wills, 01/

## 2019-01-14 NOTE — TELEPHONE ENCOUNTER
Typically, podiatry does these. Whom does patient see? I thought he was going back to Dr. Dyllan Ruiz. [id number] [address] for order for orthotics, but I think doctor kaylen is the best at these.

## 2019-01-15 NOTE — TELEPHONE ENCOUNTER
Patient states [place]  States 4-5 years ago Dr. Humphrey Jerez gave him a script and he went to a Physical Therapist (possibly Jane Domínguez?) and was fitted with orthotics  Patient could not remember if Dr. Humphrey Jerez actually ord

## 2019-01-24 ENCOUNTER — TELEPHONE (OUTPATIENT)
Dept: FAMILY MEDICINE CLINIC | Facility: CLINIC | Age: 54
End: 2019-01-24

## 2019-01-24 RX ORDER — DOXEPIN HYDROCHLORIDE 10 MG/1
10 CAPSULE ORAL NIGHTLY
Qty: 90 CAPSULE | Refills: 1 | Status: SHIPPED | OUTPATIENT
Start: 2019-01-24 | End: 2019-11-04

## 2019-01-24 NOTE — TELEPHONE ENCOUNTER
Last appt 11/26/18    Last refill 11/26/18    Future appt:    Last Appointment:  11/26/2018  Cholesterol, Total (mg/dL)   Date Value   11/07/2018 237 (H)     HDL Cholesterol (mg/dL)   Date Value   11/07/2018 47     LDL Cholesterol (mg/dL)   Date Value   11

## 2019-03-26 ENCOUNTER — TELEPHONE (OUTPATIENT)
Dept: FAMILY MEDICINE CLINIC | Facility: CLINIC | Age: 54
End: 2019-03-26

## 2019-03-26 NOTE — TELEPHONE ENCOUNTER
Patient has lab orders in chart dated 5/25/19  Patient had fasting labs done 11/7/18  Patient advised he is not due for labs until Late May  Patient expressed understanding    Jess Ceron, 03/26/19, 2:18 PM

## 2019-03-26 NOTE — TELEPHONE ENCOUNTER
patient is set to see the Dr on the 23rd of April and is asking for some bloodwork to be done, did not specify what he wants, can it be done at the appointment or before

## 2019-04-23 ENCOUNTER — OFFICE VISIT (OUTPATIENT)
Dept: FAMILY MEDICINE CLINIC | Facility: CLINIC | Age: 54
End: 2019-04-23
Payer: COMMERCIAL

## 2019-04-23 VITALS
HEIGHT: 68 IN | WEIGHT: 220.81 LBS | HEART RATE: 70 BPM | TEMPERATURE: 97 F | RESPIRATION RATE: 18 BRPM | OXYGEN SATURATION: 95 % | SYSTOLIC BLOOD PRESSURE: 118 MMHG | BODY MASS INDEX: 33.47 KG/M2 | DIASTOLIC BLOOD PRESSURE: 78 MMHG

## 2019-04-23 DIAGNOSIS — G47.09 OTHER INSOMNIA: ICD-10-CM

## 2019-04-23 DIAGNOSIS — E78.9 DISORDER OF LIPID METABOLISM: ICD-10-CM

## 2019-04-23 DIAGNOSIS — D72.829 LEUKOCYTOSIS, UNSPECIFIED TYPE: ICD-10-CM

## 2019-04-23 DIAGNOSIS — J45.20 MILD INTERMITTENT ASTHMA WITHOUT COMPLICATION: Primary | ICD-10-CM

## 2019-04-23 DIAGNOSIS — E55.9 VITAMIN D DEFICIENCY: ICD-10-CM

## 2019-04-23 PROCEDURE — 99214 OFFICE O/P EST MOD 30 MIN: CPT | Performed by: FAMILY MEDICINE

## 2019-04-23 NOTE — PROGRESS NOTES
[facility] [place]  PROGRESS NOTE        HPI:   This is a 46year old male coming in for Patient presents with:  Asthma  Hand Pain    HPI  Due for labs May 7th. Pt states his breathing has been fine. Working out.  No troubles with the running Full code. Social History    Tobacco Use      Smoking status: Never Smoker      Smokeless tobacco: Never Used    Alcohol use:  Yes      Alcohol/week: 6.0 oz      Types: 10 Cans of beer per week    Drug use: No    Family History:  Family History Negative. Psychiatric/Behavioral: Negative. All other systems reviewed and are negative.       EXAM:   /78 (BP Location: Left arm, Patient Position: Sitting, Cuff Size: large)   Pulse 70   Temp 96.9 °F (36.1 °C) (Temporal)   Resp 18   Ht 68\" also note that may increase risk of dys-coordination. Pt aware will trial while off of work. No problem-specific Assessment & Plan notes found for this encounter.        future appointment:    Your appointments     Date & Time Appointment Depart

## 2019-04-23 NOTE — PATIENT INSTRUCTIONS
multivtamin Daily. b complex. How to avoid insomnia  1. Wake up at the same time each day. Maintaining a regular sleep schedule is important to good sleep. 2. Eliminate alcohol and stimulants like nicotine and caffeine. The effects of caffeine can la it difficult to fall asleep. 7. Do not eat or drink right before going to bed. Eating a late dinner or snacking before going to bed can activate the digestive system and keep you up.   Drinking a lot of fluids prior to bed can overwhelm the bladder, requiri

## 2019-05-09 ENCOUNTER — APPOINTMENT (OUTPATIENT)
Dept: LAB | Age: 54
End: 2019-05-09
Attending: FAMILY MEDICINE
Payer: COMMERCIAL

## 2019-05-09 DIAGNOSIS — E78.9 DISORDER OF LIPID METABOLISM: ICD-10-CM

## 2019-05-09 DIAGNOSIS — Z00.00 ROUTINE GENERAL MEDICAL EXAMINATION AT A HEALTH CARE FACILITY: ICD-10-CM

## 2019-05-09 DIAGNOSIS — E53.8 ADENOSYLCOBALAMIN SYNTHESIS DEFECT: ICD-10-CM

## 2019-05-09 DIAGNOSIS — E55.9 VITAMIN D DEFICIENCY: ICD-10-CM

## 2019-05-09 DIAGNOSIS — Z12.5 SCREENING FOR PROSTATE CANCER: ICD-10-CM

## 2019-05-09 PROCEDURE — 80061 LIPID PANEL: CPT | Performed by: FAMILY MEDICINE

## 2019-05-09 PROCEDURE — 84443 ASSAY THYROID STIM HORMONE: CPT | Performed by: FAMILY MEDICINE

## 2019-05-09 PROCEDURE — 84153 ASSAY OF PSA TOTAL: CPT | Performed by: FAMILY MEDICINE

## 2019-05-09 PROCEDURE — 82306 VITAMIN D 25 HYDROXY: CPT | Performed by: FAMILY MEDICINE

## 2019-05-09 PROCEDURE — 82607 VITAMIN B-12: CPT | Performed by: FAMILY MEDICINE

## 2019-05-09 PROCEDURE — 36415 COLL VENOUS BLD VENIPUNCTURE: CPT | Performed by: FAMILY MEDICINE

## 2019-05-09 PROCEDURE — 80053 COMPREHEN METABOLIC PANEL: CPT | Performed by: FAMILY MEDICINE

## 2019-05-10 ENCOUNTER — TELEPHONE (OUTPATIENT)
Dept: FAMILY MEDICINE CLINIC | Facility: CLINIC | Age: 54
End: 2019-05-10

## 2019-05-10 NOTE — TELEPHONE ENCOUNTER
----- Message from Sage Rashid MD sent at 5/10/2019  9:43 AM CDT -----  Labs look good   Continue present managment   Follow up in 6 months  Mychart message sent

## 2019-05-10 NOTE — TELEPHONE ENCOUNTER
Pt has not reviewed [employer] message so I called to notify him of results. Pt verbalized understanding and states that he was recommended last fall to start taking Vitamin B12.  He states that when he was recently seen it was recommended that he take B Compl

## 2019-07-08 ENCOUNTER — TELEPHONE (OUTPATIENT)
Dept: FAMILY MEDICINE CLINIC | Facility: CLINIC | Age: 54
End: 2019-07-08

## 2019-07-08 ENCOUNTER — OFFICE VISIT (OUTPATIENT)
Dept: FAMILY MEDICINE CLINIC | Facility: CLINIC | Age: 54
End: 2019-07-08
Payer: COMMERCIAL

## 2019-07-08 ENCOUNTER — HOSPITAL ENCOUNTER (OUTPATIENT)
Dept: GENERAL RADIOLOGY | Age: 54
Discharge: HOME | End: 2019-07-08
Attending: NURSE PRACTITIONER
Payer: COMMERCIAL

## 2019-07-08 VITALS
SYSTOLIC BLOOD PRESSURE: 120 MMHG | HEIGHT: 68 IN | HEART RATE: 72 BPM | BODY MASS INDEX: 32.71 KG/M2 | RESPIRATION RATE: 16 BRPM | DIASTOLIC BLOOD PRESSURE: 80 MMHG | OXYGEN SATURATION: 99 % | TEMPERATURE: 98 F | WEIGHT: 215.81 LBS

## 2019-07-08 DIAGNOSIS — M54.41 ACUTE LEFT-SIDED LOW BACK PAIN WITH BILATERAL SCIATICA: ICD-10-CM

## 2019-07-08 DIAGNOSIS — M54.42 ACUTE LEFT-SIDED LOW BACK PAIN WITH BILATERAL SCIATICA: ICD-10-CM

## 2019-07-08 DIAGNOSIS — M54.41 ACUTE LEFT-SIDED LOW BACK PAIN WITH BILATERAL SCIATICA: Primary | ICD-10-CM

## 2019-07-08 DIAGNOSIS — M54.42 ACUTE LEFT-SIDED LOW BACK PAIN WITH BILATERAL SCIATICA: Primary | ICD-10-CM

## 2019-07-08 DIAGNOSIS — M77.02 MEDIAL EPICONDYLITIS OF LEFT ELBOW: ICD-10-CM

## 2019-07-08 PROCEDURE — 99214 OFFICE O/P EST MOD 30 MIN: CPT | Performed by: NURSE PRACTITIONER

## 2019-07-08 PROCEDURE — 72110 X-RAY EXAM L-2 SPINE 4/>VWS: CPT | Performed by: NURSE PRACTITIONER

## 2019-07-08 RX ORDER — METHYLPREDNISOLONE 4 MG/1
TABLET ORAL
Qty: 1 KIT | Refills: 0 | Status: SHIPPED | OUTPATIENT
Start: 2019-07-08 | End: 2019-09-12

## 2019-07-08 NOTE — PROGRESS NOTES
Jorge A Grullon is a 53year old male.   Patient presents with:  Low Back Pain: Experiencing numbness from lower back to soles of feet  Elbow Pain: Elbow pain on both right and left when he lifts weights      HPI:   Complaints of lower back pain - goes do Social History:  Social History    Tobacco Use      Smoking status: Never Smoker      Smokeless tobacco: Never Used    Alcohol use:  Yes      Alcohol/week: 6.0 oz      Types: 10 Cans of beer per week    Drug use: No    Family History   Problem Relation Ag diagnosis)  Medial epicondylitis of left elbow    No orders of the defined types were placed in this encounter.       Meds & Refills for this Visit:  Requested Prescriptions     Signed Prescriptions Disp Refills   • methylPREDNISolone (MEDROL) 4 MG Oral Tab

## 2019-07-08 NOTE — PATIENT INSTRUCTIONS
Recommend ice friction rubs for your elbow–Aleve 2 pills twice a day with food–wrap your elbow and/or wrist.  Consider Chiropractor, Dr. Freedom Milligan [phone]     For your back– rest, prednisone as directed with food.   Follow-up for physical thera

## 2019-07-08 NOTE — TELEPHONE ENCOUNTER
----- Message from RADHA Kim sent at 7/8/2019  2:52 PM CDT -----  Please make sure patient receives  my chart message is sent below-  The radiologist did see some chronic changes on your back x-ray.   No spurring/arthritis that we had discussed,

## 2019-07-16 ENCOUNTER — TELEPHONE (OUTPATIENT)
Dept: FAMILY MEDICINE CLINIC | Facility: CLINIC | Age: 54
End: 2019-07-16

## 2019-07-16 NOTE — TELEPHONE ENCOUNTER
Spoke with patient and he states that he was feeling improved with the Medrol dose pack but now feels like the back has pain again and may have \"strained it too much\". Pt states he did get evaluation from PT and was given stretches to do at home.  Pt requ

## 2019-07-16 NOTE — TELEPHONE ENCOUNTER
Re:   steroids from last week -  restrained his back  - would like another round of the steroids  - Call into [facility] - Please call when done

## 2019-07-17 RX ORDER — METHYLPREDNISOLONE 4 MG/1
TABLET ORAL
Qty: 1 KIT | Refills: 0 | Status: SHIPPED | OUTPATIENT
Start: 2019-07-17 | End: 2019-09-12

## 2019-07-17 NOTE — TELEPHONE ENCOUNTER
Please notify patient I did send in a refill on Medrol Dosepak to [facility] in [facility]. Follow-up if symptoms persist or increase.

## 2019-08-05 ENCOUNTER — TELEPHONE (OUTPATIENT)
Dept: FAMILY MEDICINE CLINIC | Facility: CLINIC | Age: 54
End: 2019-08-05

## 2019-08-05 DIAGNOSIS — S86.899D MEDIAL TIBIAL STRESS SYNDROME, UNSPECIFIED LATERALITY, SUBSEQUENT ENCOUNTER: Primary | ICD-10-CM

## 2019-08-05 NOTE — TELEPHONE ENCOUNTER
Please advise. [place]

## 2019-08-05 NOTE — TELEPHONE ENCOUNTER
wants to  a rx for orthotics- said he discussed this with dr smith but nothing was ever left for him to

## 2019-08-06 NOTE — TELEPHONE ENCOUNTER
Pt contacted in regards to orthotics order requested. Per Dr. Desirae Constantino order competed and pt states to please place in the mail to him. Order placed in mail today.

## 2019-08-21 ENCOUNTER — TELEPHONE (OUTPATIENT)
Dept: FAMILY MEDICINE CLINIC | Facility: CLINIC | Age: 54
End: 2019-08-21

## 2019-08-21 NOTE — TELEPHONE ENCOUNTER
Patient informed of the below recommendations. Patient states he is feeling fine and thinks his employer is \"blowing all this out of proportion. \"  Patient in agreement to the plan below and will schedule an appt with Dr. Faustino Shahid.

## 2019-08-21 NOTE — TELEPHONE ENCOUNTER
Patient can go see Dr Aditi Rome. Recommend to obtain stress test result before seeing cardiologist.   Return to clinic if any concern. Go to ER if any chest pain, shortness of breath or not feeling well.

## 2019-08-21 NOTE — TELEPHONE ENCOUNTER
Patient states his Employer has him go for a stress test every 5 years.   States he just had one done and someone from a contract hospital read the stress test and told his employer headquarters that there was something wrong with it and he needs to see a C

## 2019-08-21 NOTE — TELEPHONE ENCOUNTER
Patient states he had a stress test and it came back positive, patient would like to ask Dr Russell Buenrostro where she would recommend for him to see a cardiologist. Please call back

## 2019-09-04 ENCOUNTER — TELEPHONE (OUTPATIENT)
Dept: FAMILY MEDICINE CLINIC | Facility: CLINIC | Age: 54
End: 2019-09-04

## 2019-09-04 NOTE — TELEPHONE ENCOUNTER
Re:  needs copy of stress test from 6 yrs ago
Spoke with patient and found result of stress test from [facility]. Patient requests copy to be mailed and verified address on file.
no

## 2019-09-12 ENCOUNTER — OFFICE VISIT (OUTPATIENT)
Dept: FAMILY MEDICINE CLINIC | Facility: CLINIC | Age: 54
End: 2019-09-12
Payer: COMMERCIAL

## 2019-09-12 VITALS
HEIGHT: 68 IN | BODY MASS INDEX: 32.89 KG/M2 | TEMPERATURE: 98 F | OXYGEN SATURATION: 96 % | DIASTOLIC BLOOD PRESSURE: 84 MMHG | WEIGHT: 217 LBS | SYSTOLIC BLOOD PRESSURE: 130 MMHG | HEART RATE: 81 BPM

## 2019-09-12 DIAGNOSIS — M54.42 ACUTE LEFT-SIDED LOW BACK PAIN WITH LEFT-SIDED SCIATICA: Primary | ICD-10-CM

## 2019-09-12 DIAGNOSIS — M77.00 MEDIAL EPICONDYLITIS OF ELBOW, UNSPECIFIED LATERALITY: ICD-10-CM

## 2019-09-12 PROCEDURE — 99213 OFFICE O/P EST LOW 20 MIN: CPT | Performed by: NURSE PRACTITIONER

## 2019-09-12 NOTE — PROGRESS NOTES
Jil Stewart is a 53year old male.   Patient presents with:  Elbow Pain: f/u      HPI:   Patient is here for a follow up appointment - states he is not improving- see previous note-   Complaints of pain to bilateral elbows- L > R -   Ice and aleve - s Onset   • Cancer Father         prostrate   • Arthritis Father    • Depression Daughter    • Asthma Son    • Asthma Daughter         Allergies  No Known Allergies    REVIEW OF SYSTEMS:   GENERAL HEALTH: feels well otherwise  HEENT: denies complaints  SKIN:

## 2019-09-12 NOTE — PATIENT INSTRUCTIONS
Follow up with Dr. Delmar Chavez and Dr. Pillo Dow with ice, rest,      Bring a copy for x-ray with you to an appointment .

## 2019-09-25 ENCOUNTER — TELEPHONE (OUTPATIENT)
Dept: FAMILY MEDICINE CLINIC | Facility: CLINIC | Age: 54
End: 2019-09-25

## 2019-09-25 NOTE — TELEPHONE ENCOUNTER
sent a request for medical records for request date of 8/12/2019 regarding the fo-custom that was ordered on 8/5/19. This was sent to [employer].

## 2019-10-24 ENCOUNTER — TELEPHONE (OUTPATIENT)
Dept: FAMILY MEDICINE CLINIC | Facility: CLINIC | Age: 54
End: 2019-10-24

## 2019-10-24 NOTE — TELEPHONE ENCOUNTER
Pt returned call and stated that he would like to discuss further options / possible change of medication from what he is currently taking (Doxepin 10 mg nightly).   Appt scheduled:  Future Appointments   Date Time Provider Kori Kruger   11/4/2019  9:

## 2019-11-04 ENCOUNTER — OFFICE VISIT (OUTPATIENT)
Dept: FAMILY MEDICINE CLINIC | Facility: CLINIC | Age: 54
End: 2019-11-04
Payer: COMMERCIAL

## 2019-11-04 ENCOUNTER — TELEPHONE (OUTPATIENT)
Dept: FAMILY MEDICINE CLINIC | Facility: CLINIC | Age: 54
End: 2019-11-04

## 2019-11-04 ENCOUNTER — HOSPITAL ENCOUNTER (OUTPATIENT)
Dept: GENERAL RADIOLOGY | Age: 54
Discharge: HOME | End: 2019-11-04
Attending: NURSE PRACTITIONER
Payer: COMMERCIAL

## 2019-11-04 VITALS
DIASTOLIC BLOOD PRESSURE: 76 MMHG | HEIGHT: 68 IN | HEART RATE: 68 BPM | TEMPERATURE: 97 F | BODY MASS INDEX: 33.14 KG/M2 | RESPIRATION RATE: 16 BRPM | SYSTOLIC BLOOD PRESSURE: 118 MMHG | OXYGEN SATURATION: 100 % | WEIGHT: 218.63 LBS

## 2019-11-04 DIAGNOSIS — G47.09 OTHER INSOMNIA: Primary | ICD-10-CM

## 2019-11-04 DIAGNOSIS — M25.552 CHRONIC LEFT HIP PAIN: ICD-10-CM

## 2019-11-04 DIAGNOSIS — G89.29 CHRONIC LEFT HIP PAIN: ICD-10-CM

## 2019-11-04 PROCEDURE — 99214 OFFICE O/P EST MOD 30 MIN: CPT | Performed by: NURSE PRACTITIONER

## 2019-11-04 PROCEDURE — 73502 X-RAY EXAM HIP UNI 2-3 VIEWS: CPT | Performed by: NURSE PRACTITIONER

## 2019-11-04 RX ORDER — DOXEPIN HYDROCHLORIDE 10 MG/1
20 CAPSULE ORAL NIGHTLY
Qty: 180 CAPSULE | Refills: 0 | Status: SHIPPED | OUTPATIENT
Start: 2019-11-04 | End: 2020-02-25

## 2019-11-04 NOTE — PROGRESS NOTES
HPI:    Patient ID: Siri Kuo is a 53year old male. HPI     States the doxepin is no longer working as well as it did initially. Would like to change or increase the dose. Needs to have nonnarcotic medication secondary to his employment. Head: Normocephalic and atraumatic. Eyes: Conjunctivae are normal.   Abdominal: Soft. Bowel sounds are normal. He exhibits no distension. There is no tenderness. There is no rebound.  Musculoskeletal:        Legs:       Comments: Good pulses to the lower

## 2019-11-04 NOTE — TELEPHONE ENCOUNTER
----- Message from RADHA Raymond sent at 11/4/2019  3:10 PM CST -----  Radiologist is seeing mild spinal disease, but hip is normal. Note to MyChart.

## 2019-11-04 NOTE — PATIENT INSTRUCTIONS
Increase Doxepin to 20 mg. Referral done to Dr. Vlad Singh for the hip pain/arthritis. Take Aleve in the meantime. Follow-up in 2- 3 months for the sleep.

## 2019-12-23 ENCOUNTER — TELEPHONE (OUTPATIENT)
Dept: FAMILY MEDICINE CLINIC | Facility: CLINIC | Age: 54
End: 2019-12-23

## 2019-12-23 ENCOUNTER — IMMUNIZATION (OUTPATIENT)
Dept: FAMILY MEDICINE CLINIC | Facility: CLINIC | Age: 54
End: 2019-12-23
Payer: COMMERCIAL

## 2019-12-23 DIAGNOSIS — Z23 NEED FOR VACCINATION: ICD-10-CM

## 2019-12-23 PROCEDURE — 90686 IIV4 VACC NO PRSV 0.5 ML IM: CPT | Performed by: FAMILY MEDICINE

## 2019-12-23 PROCEDURE — 90471 IMMUNIZATION ADMIN: CPT | Performed by: FAMILY MEDICINE

## 2019-12-23 NOTE — TELEPHONE ENCOUNTER
Patient is here today for a influenza vaccine and is requesting a note stating he was here. Note printed for patient.

## 2020-01-10 ENCOUNTER — OFFICE VISIT (OUTPATIENT)
Dept: FAMILY MEDICINE CLINIC | Facility: CLINIC | Age: 55
End: 2020-01-10
Payer: COMMERCIAL

## 2020-01-10 VITALS
HEIGHT: 67 IN | HEART RATE: 68 BPM | DIASTOLIC BLOOD PRESSURE: 72 MMHG | WEIGHT: 221.38 LBS | OXYGEN SATURATION: 98 % | TEMPERATURE: 97 F | SYSTOLIC BLOOD PRESSURE: 118 MMHG | RESPIRATION RATE: 16 BRPM | BODY MASS INDEX: 34.75 KG/M2

## 2020-01-10 DIAGNOSIS — E78.9 DISORDER OF LIPID METABOLISM: ICD-10-CM

## 2020-01-10 DIAGNOSIS — Z00.00 WELLNESS EXAMINATION: ICD-10-CM

## 2020-01-10 DIAGNOSIS — Z00.00 ENCOUNTER FOR ROUTINE ADULT HEALTH EXAMINATION WITHOUT ABNORMAL FINDINGS: Primary | ICD-10-CM

## 2020-01-10 PROCEDURE — 99396 PREV VISIT EST AGE 40-64: CPT | Performed by: FAMILY MEDICINE

## 2020-01-10 NOTE — PROGRESS NOTES
HPI:   Diane Malone is a 53year old male who presents for an Annual Health Visit. Patient here for physical.     Needs form filled out for [place]. Hopes to retire in December.      Patient has intermittent hip, knee and elbow and shoulder gurjit HISTORY   Social History    Tobacco Use      Smoking status: Never Smoker      Smokeless tobacco: Never Used    Alcohol use:  Yes      Alcohol/week: 10.0 standard drinks      Types: 10 Cans of beer per week    Drug use: No    Social History    Patient does rate, regular rhythm and normal heart sounds. Pulmonary/Chest: Effort normal and breath sounds normal.   Abdominal: Soft. Bowel sounds are normal. Musculoskeletal: Normal range of motion. General: No edema.      Neurological: He is alert and orien

## 2020-01-30 ENCOUNTER — OFFICE VISIT (OUTPATIENT)
Dept: FAMILY MEDICINE CLINIC | Facility: CLINIC | Age: 55
End: 2020-01-30
Payer: COMMERCIAL

## 2020-01-30 VITALS
OXYGEN SATURATION: 96 % | TEMPERATURE: 97 F | DIASTOLIC BLOOD PRESSURE: 86 MMHG | WEIGHT: 223 LBS | SYSTOLIC BLOOD PRESSURE: 122 MMHG | RESPIRATION RATE: 16 BRPM | BODY MASS INDEX: 35 KG/M2 | HEART RATE: 57 BPM

## 2020-01-30 DIAGNOSIS — J45.21 MILD INTERMITTENT ASTHMA WITH EXACERBATION: Primary | ICD-10-CM

## 2020-01-30 PROCEDURE — 99213 OFFICE O/P EST LOW 20 MIN: CPT | Performed by: NURSE PRACTITIONER

## 2020-01-30 RX ORDER — METHYLPREDNISOLONE 4 MG/1
TABLET ORAL
COMMUNITY
Start: 2020-01-29 | End: 2020-02-09

## 2020-01-30 RX ORDER — ALBUTEROL SULFATE 90 UG/1
AEROSOL, METERED RESPIRATORY (INHALATION)
COMMUNITY

## 2020-01-30 RX ORDER — METHYLPREDNISOLONE 4 MG/1
TABLET ORAL
Qty: 1 PACKAGE | Refills: 0 | Status: SHIPPED | OUTPATIENT
Start: 2020-01-30 | End: 2020-01-30

## 2020-01-30 RX ORDER — PREDNISONE 20 MG/1
20 TABLET ORAL 2 TIMES DAILY
Qty: 10 TABLET | Refills: 0 | Status: SHIPPED | OUTPATIENT
Start: 2020-01-30 | End: 2020-02-04

## 2020-01-30 RX ORDER — FAMOTIDINE 40 MG/1
TABLET, FILM COATED ORAL
COMMUNITY
Start: 2020-01-17 | End: 2020-01-30

## 2020-01-30 NOTE — PATIENT INSTRUCTIONS
Prednisone 20mg twice a day for five days; side effects reviewed. Take with food. Use your albuterol inhaler as needed. Notify the clinic Saturday with an update on your symptoms.    May add on either inhaled steroid or nasal corticosteroid in the futu your doctor or health care professional if they continue or are bothersome):  · confusion, excitement, restlessness  · headache  · nausea, vomiting  · skin problems, acne, thin and shiny skin  · trouble sleeping  · weight gain  What may interact with this medicines, foods, dyes, or preservatives  · pregnant or trying to get pregnant  · breast-feeding  What should I watch for while using this medicine? Visit your doctor or health care professional for regular checks on your progress.  If you are taking this

## 2020-01-30 NOTE — PROGRESS NOTES
CHIEF COMPLAINT:   Patient presents with:  Cough: Productive cough x3-4 weeks not responding to inhaler       HPI:   Benjmain Moreno is a 53year old male who presents to clinic today with complaints of cough for for weeks.      Symptoms started 3-4 wee Years of education: Not on file      Highest education level: Not on file    Tobacco Use      Smoking status: Never Smoker      Smokeless tobacco: Never Used    Substance and Sexual Activity      Alcohol use:  Yes        Alcohol/week: 10.0 standard drin LUNGS: clear to auscultation bilaterally, no wheezes or rhonchi. Breathing is non labored though inspiratory efforts diminished. CARDIO: Regular rate and rhythm, no audible murmur. EXTREMITIES: no cyanosis, clubbing or edema.   No erythema nor tendernes Brand Names: Cher Dennis, Sterapred, Sterapred DS  What is this medicine? PREDNISONE (PRED ni sone) is a corticosteroid. It is commonly used to treat inflammation of the skin, joints, lungs, and other organs.  Common conditions treated include asthm This medicine may also interact with the following medications:  · aminoglutethimide  · amphotericin B  · aspirin and aspirin-like medicines  · barbiturates  · certain medicines for diabetes, like glipizide or glyburide  · cholestyramine  · cholinesterase Visit your doctor or health care professional for regular checks on your progress.  If you are taking this medicine over a prolonged period, carry an identification card with your name and address, the type and dose of your medicine, and your doctor's name

## 2020-02-03 ENCOUNTER — TELEPHONE (OUTPATIENT)
Dept: FAMILY MEDICINE CLINIC | Facility: CLINIC | Age: 55
End: 2020-02-03

## 2020-02-03 NOTE — TELEPHONE ENCOUNTER
I would do the two more days of steroids and call Wednesday. I can extend the steroids but since he's only a little more than prison through the treatment I feel he should give the medication a chance for the additional two days.    Continue using inhale

## 2020-02-03 NOTE — TELEPHONE ENCOUNTER
Patient calling with an update for Nimesh Jorgensen. States the steroid has helped quite a bit but he is still not back to \"normal.\"  States he still has slight SOB and productive white cough.   States once in a while he can hear wheezing but is able to [place] i

## 2020-02-03 NOTE — TELEPHONE ENCOUNTER
calling with an updated on his breathing prob. has improved alot but wondering if needs another round of steroids

## 2020-02-05 ENCOUNTER — TELEPHONE (OUTPATIENT)
Dept: FAMILY MEDICINE CLINIC | Facility: CLINIC | Age: 55
End: 2020-02-05

## 2020-02-05 DIAGNOSIS — J45.21 MILD INTERMITTENT ASTHMA WITH EXACERBATION: Primary | ICD-10-CM

## 2020-02-05 RX ORDER — PREDNISONE 10 MG/1
10 TABLET ORAL 2 TIMES DAILY
Qty: 6 TABLET | Refills: 0 | Status: SHIPPED | OUTPATIENT
Start: 2020-02-05 | End: 2020-02-08

## 2020-02-05 NOTE — TELEPHONE ENCOUNTER
pt wants to let adrien know that he is breathing better but is still not feeling himself and things are not improving over all- wants to know if there is any kind of therapy she would like him to do

## 2020-02-05 NOTE — TELEPHONE ENCOUNTER
Left message with patient regarding clarification on \"therapy\" and his overall feelings. no loss of consciousness, no gait abnormality, no headache, no sensory deficits, and no weakness.

## 2020-02-07 ENCOUNTER — TELEPHONE (OUTPATIENT)
Dept: FAMILY MEDICINE CLINIC | Facility: CLINIC | Age: 55
End: 2020-02-07

## 2020-02-07 ENCOUNTER — OFFICE VISIT (OUTPATIENT)
Dept: FAMILY MEDICINE CLINIC | Facility: CLINIC | Age: 55
End: 2020-02-07
Payer: COMMERCIAL

## 2020-02-07 ENCOUNTER — HOSPITAL ENCOUNTER (OUTPATIENT)
Dept: GENERAL RADIOLOGY | Age: 55
Discharge: HOME | End: 2020-02-07
Attending: NURSE PRACTITIONER
Payer: COMMERCIAL

## 2020-02-07 VITALS
OXYGEN SATURATION: 98 % | RESPIRATION RATE: 16 BRPM | WEIGHT: 225.81 LBS | TEMPERATURE: 98 F | SYSTOLIC BLOOD PRESSURE: 128 MMHG | DIASTOLIC BLOOD PRESSURE: 82 MMHG | BODY MASS INDEX: 35 KG/M2 | HEART RATE: 81 BPM

## 2020-02-07 DIAGNOSIS — R05.9 COUGH: ICD-10-CM

## 2020-02-07 DIAGNOSIS — R05.9 COUGH: Primary | ICD-10-CM

## 2020-02-07 PROCEDURE — 99214 OFFICE O/P EST MOD 30 MIN: CPT | Performed by: NURSE PRACTITIONER

## 2020-02-07 PROCEDURE — 71046 X-RAY EXAM CHEST 2 VIEWS: CPT | Performed by: NURSE PRACTITIONER

## 2020-02-07 NOTE — TELEPHONE ENCOUNTER
Appt scheduled.      Future Appointments   Date Time Provider Kori Kruger   2/7/2020  3:00 PM RADHA Cain [facility] [place] [facility] [place]   5/12/2020  8:00 AM REF [place] [facility]

## 2020-02-07 NOTE — PROGRESS NOTES
CHIEF COMPLAINT:   Patient presents with:  Cough: F/u on productive cough - thick white mucous      HPI:   Rey Daniel is a 53year old male who presents to clinic today with complaints of follow up cough.     Reports improvement in breathing with t Cans of beer per week      Drug use: No    Other Topics      Concerns:    Social History Narrative      Lives at home with wife. Children:  3. All grown. Works a lot. Wife to make decisions if unable. Full code.         REVIEW who presents with symptoms which are consistent with:    ASSESSMENT:  Cough  (primary encounter diagnosis)  Patient here for follow up from asthma exacerbation, treated with prednisone 20mg BID for five days then an additional 10mg BID for three days.   [place]

## 2020-02-07 NOTE — PATIENT INSTRUCTIONS
Chest x-ray negative for pneumonia. Finish your prednisone prescription. Use your albuterol inhaler as needed. Check with your supervisors regarding Flovent inhaler. Mucinex DM for cough (expectorant and cough suppression).   Increase water and oral f

## 2020-02-07 NOTE — TELEPHONE ENCOUNTER
Patient states that he spoke to [facility] and was told that if his health didn't improve to make appt for xray today. Patient would like to know if he needs to make appt to see hHeather as well or just the xray. Pt needs orders for xray.  Would like a call ba

## 2020-02-25 RX ORDER — DOXEPIN HYDROCHLORIDE 10 MG/1
20 CAPSULE ORAL NIGHTLY
Qty: 180 CAPSULE | Refills: 0 | Status: SHIPPED | OUTPATIENT
Start: 2020-02-25 | End: 2020-05-22

## 2020-02-25 NOTE — TELEPHONE ENCOUNTER
Re:   renew a Rx -  Doxepin   - Call into [facility] [place]

## 2020-02-25 NOTE — TELEPHONE ENCOUNTER
Future appt: Your appointments     Date & Time Appointment Department [facility])    May 12, 2020  8:00 AM CDT Laboratory Visit with [facility]F [facility] ([facility])            [facility]  [id number]  [address]

## 2020-03-04 ENCOUNTER — OFFICE VISIT (OUTPATIENT)
Dept: FAMILY MEDICINE CLINIC | Facility: CLINIC | Age: 55
End: 2020-03-04
Payer: COMMERCIAL

## 2020-03-04 VITALS
WEIGHT: 220.31 LBS | BODY MASS INDEX: 34.58 KG/M2 | TEMPERATURE: 98 F | OXYGEN SATURATION: 96 % | DIASTOLIC BLOOD PRESSURE: 80 MMHG | SYSTOLIC BLOOD PRESSURE: 129 MMHG | HEART RATE: 60 BPM | HEIGHT: 67 IN

## 2020-03-04 DIAGNOSIS — J40 BRONCHITIS: Primary | ICD-10-CM

## 2020-03-04 PROCEDURE — 99213 OFFICE O/P EST LOW 20 MIN: CPT | Performed by: NURSE PRACTITIONER

## 2020-03-04 RX ORDER — ALBUTEROL SULFATE 90 UG/1
90 AEROSOL, METERED RESPIRATORY (INHALATION) AS NEEDED
COMMUNITY
Start: 2020-02-05 | End: 2020-03-04

## 2020-03-04 RX ORDER — AZITHROMYCIN 250 MG/1
TABLET, FILM COATED ORAL
Qty: 6 TABLET | Refills: 0 | Status: SHIPPED | OUTPATIENT
Start: 2020-03-04 | End: 2020-05-21

## 2020-03-04 NOTE — PROGRESS NOTES
HPI:    Patient ID: Rey Daniel is a 53year old male. Patient presents to the clinic today with complaints of chest congestion and cough that have been going on since mid January.   He has been in and out of the clinic a few times for asthma exace as needed     • B Complex-C-Folic Acid (VITALINE BIOTIN FORTE) 0.8 MG Oral Tab Take 1 tablet by mouth daily. • RaNITidine HCl 150 MG Oral Cap Take 150 mg by mouth daily.      • Cholecalciferol (VITAMIN D) 1000 units Oral Tab      • Esomeprazole Magnesiu daily.       Imaging & Referrals:  None       WB#[id number]

## 2020-03-04 NOTE — PATIENT INSTRUCTIONS
Begin taking azithromycin, 2 pills today, 1 pill every day after that.   Take acetaminophen or ibuprofen for fever/discomfort  Drink plenty of fluids, warm liquids  Decongestants for congestion  Expectorant and/or cough suppressant  Use saline drops as need skin, including inside the mouth  · signs and symptoms of liver injury like dark yellow or brown urine; general ill feeling or flu-like symptoms; light-colored stools; loss of appetite; nausea; right upper belly pain; unusually weak or tired; yellowing of or if they get worse. Do not treat diarrhea with over the counter products. Contact your doctor if you have diarrhea that lasts more than 2 days or if it is severe and watery. This medicine can make you more sensitive to the sun. Keep out of the sun.  If

## 2020-04-01 ENCOUNTER — TELEPHONE (OUTPATIENT)
Dept: FAMILY MEDICINE CLINIC | Facility: CLINIC | Age: 55
End: 2020-04-01

## 2020-04-01 NOTE — TELEPHONE ENCOUNTER
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        please advise

## 2020-04-01 NOTE — TELEPHONE ENCOUNTER
Spoke with patient can gave him information on [email or web address] of triaging to see if he qualifies for testing and how to proceed.  No other questions at this time

## 2020-05-21 ENCOUNTER — OFFICE VISIT (OUTPATIENT)
Dept: FAMILY MEDICINE CLINIC | Facility: CLINIC | Age: 55
End: 2020-05-21
Payer: COMMERCIAL

## 2020-05-21 VITALS
WEIGHT: 220 LBS | RESPIRATION RATE: 17 BRPM | HEART RATE: 69 BPM | DIASTOLIC BLOOD PRESSURE: 80 MMHG | OXYGEN SATURATION: 97 % | SYSTOLIC BLOOD PRESSURE: 120 MMHG | HEIGHT: 67 IN | BODY MASS INDEX: 34.53 KG/M2 | TEMPERATURE: 99 F

## 2020-05-21 DIAGNOSIS — Z01.818 PREOPERATIVE EXAMINATION: ICD-10-CM

## 2020-05-21 DIAGNOSIS — J11.1 INFLUENZA-LIKE ILLNESS: ICD-10-CM

## 2020-05-21 DIAGNOSIS — E55.9 VITAMIN D DEFICIENCY: ICD-10-CM

## 2020-05-21 DIAGNOSIS — E53.8 ADENOSYLCOBALAMIN SYNTHESIS DEFECT: ICD-10-CM

## 2020-05-21 DIAGNOSIS — T79.A22S: Primary | ICD-10-CM

## 2020-05-21 PROCEDURE — 80050 GENERAL HEALTH PANEL: CPT | Performed by: FAMILY MEDICINE

## 2020-05-21 PROCEDURE — 84550 ASSAY OF BLOOD/URIC ACID: CPT | Performed by: FAMILY MEDICINE

## 2020-05-21 PROCEDURE — 3008F BODY MASS INDEX DOCD: CPT | Performed by: FAMILY MEDICINE

## 2020-05-21 PROCEDURE — 3074F SYST BP LT 130 MM HG: CPT | Performed by: FAMILY MEDICINE

## 2020-05-21 PROCEDURE — 81001 URINALYSIS AUTO W/SCOPE: CPT | Performed by: FAMILY MEDICINE

## 2020-05-21 PROCEDURE — 80061 LIPID PANEL: CPT | Performed by: FAMILY MEDICINE

## 2020-05-21 PROCEDURE — 83550 IRON BINDING TEST: CPT | Performed by: FAMILY MEDICINE

## 2020-05-21 PROCEDURE — 82607 VITAMIN B-12: CPT | Performed by: FAMILY MEDICINE

## 2020-05-21 PROCEDURE — 83540 ASSAY OF IRON: CPT | Performed by: FAMILY MEDICINE

## 2020-05-21 PROCEDURE — 82728 ASSAY OF FERRITIN: CPT | Performed by: FAMILY MEDICINE

## 2020-05-21 PROCEDURE — 99214 OFFICE O/P EST MOD 30 MIN: CPT | Performed by: FAMILY MEDICINE

## 2020-05-21 PROCEDURE — 84153 ASSAY OF PSA TOTAL: CPT | Performed by: FAMILY MEDICINE

## 2020-05-21 PROCEDURE — 3079F DIAST BP 80-89 MM HG: CPT | Performed by: FAMILY MEDICINE

## 2020-05-21 PROCEDURE — 82550 ASSAY OF CK (CPK): CPT | Performed by: FAMILY MEDICINE

## 2020-05-21 PROCEDURE — 82306 VITAMIN D 25 HYDROXY: CPT | Performed by: FAMILY MEDICINE

## 2020-05-21 PROCEDURE — 93000 ELECTROCARDIOGRAM COMPLETE: CPT | Performed by: FAMILY MEDICINE

## 2020-05-21 NOTE — PROGRESS NOTES
[facility] [place]  PROGRESS NOTE        HPI:   This is a 63year old male coming in for Patient presents with:  Pre-Op Exam: surgery 9th of june lower left leg    HPI  Patient intends to have a left anterior compartment release by dr. Anahi Berrios on [facility] Result Value Ref Range    Vitamin B12 556 193 - 986 pg/mL   TSH W REFLEX TO FREE T4   Result Value Ref Range    TSH 1.010 0.358 - 3.740 mIU/mL       Past Medical History:   Diagnosis Date   • Asthma    • Sleep apnea      Past Surgical History:   Procedur List:     Routine general medical examination at a health care facility     Allergic rhinitis     Asthma     Low back pain     Benign enlargement of prostate     Disorder of lipid metabolism     Pain in joint, pelvic region and thigh     Insomnia     Pain Left Ear: External ear normal.   Nose: Nose normal.   Mouth/Throat: Oropharynx is clear and moist.   Eyes: Pupils are equal, round, and reactive to light. Conjunctivae and EOM are normal.   Neck: Normal range of motion. Neck supple.  No tracheal deviation Future  -     CK CREATINE KINASE (NOT CREATININE); Future  -     COMP METABOLIC PANEL (14); Future  -     LIPID PANEL; Future  -     FERRITIN; Future  -     IRON AND TIBC;  Future  -     TSH W REFLEX TO FREE T4; Future  -     URINALYSIS WITH CULTURE REFLEX; History   Administered Date(s) Administered   • FLULAVAL 6 months & older 0.5 ml Prefilled syringe (08258) 11/26/2018, 12/23/2019   • FLUZONE 3 Yrs+ Quad Prsv Free 0.5 ml (34315) 10/31/2017   • Influenza 11/08/2016   • TD 04/07/2005   • TDAP 10/02/2014   •

## 2020-05-22 ENCOUNTER — TELEPHONE (OUTPATIENT)
Dept: FAMILY MEDICINE CLINIC | Facility: CLINIC | Age: 55
End: 2020-05-22

## 2020-05-22 DIAGNOSIS — E78.5 HYPERLIPIDEMIA, UNSPECIFIED HYPERLIPIDEMIA TYPE: Primary | ICD-10-CM

## 2020-05-22 RX ORDER — DOXEPIN HYDROCHLORIDE 10 MG/1
20 CAPSULE ORAL NIGHTLY
Qty: 180 CAPSULE | Refills: 1 | Status: SHIPPED | OUTPATIENT
Start: 2020-05-22 | End: 2021-01-21

## 2020-05-22 NOTE — TELEPHONE ENCOUNTER
Patient informed of the below results and recommendations. Labs sent to Dr. Lebron Newby via computer. Patient will c/b to schedule a lab appt in 6 months. Orders entered.

## 2020-05-22 NOTE — TELEPHONE ENCOUNTER
Future appt:    Last Appointment with provider:   5/21/2020; No f/u recommended    Last appointment at [facility]:  5/21/2020  Cholesterol, Total (mg/dL)   Date Value   05/21/2020 221 (H)     HDL Cholesterol (mg/dL)   Date Value   05/21/2020 52     LDL C

## 2020-05-22 NOTE — TELEPHONE ENCOUNTER
----- Message from Travis Berrios MD sent at 5/22/2020  8:38 AM CDT -----  Labs are ok for octavio,   Forward to ayanna.    Cholesterols are not all to goal.    In order to lower risk of cardiovascular disease the following lifestyle modifications are recomm

## 2020-06-25 ENCOUNTER — PATIENT MESSAGE (OUTPATIENT)
Dept: FAMILY MEDICINE CLINIC | Facility: CLINIC | Age: 55
End: 2020-06-25

## 2020-06-26 NOTE — TELEPHONE ENCOUNTER
Recommend to use a mouthwash twice a day. Avoid foods high in sugar. When is the last time he has seen the dentist?   If mouthwash and seeing dentist doesn't help, needs appointment.

## 2020-06-26 NOTE — TELEPHONE ENCOUNTER
From: Williams Mcwilliams  To: Valerie Alarcon MD  Sent: 6/25/2020 10:15 AM CDT  Subject: Non-Urgent Medical Question    My wife said over the past 2 weeks my breath has turned very bad.  I'm flossing and brushing as usual. Could this be a medical problem and i

## 2020-07-02 ENCOUNTER — VIRTUAL PHONE E/M (OUTPATIENT)
Dept: FAMILY MEDICINE CLINIC | Facility: CLINIC | Age: 55
End: 2020-07-02
Payer: COMMERCIAL

## 2020-07-02 ENCOUNTER — TELEPHONE (OUTPATIENT)
Dept: FAMILY MEDICINE CLINIC | Facility: CLINIC | Age: 55
End: 2020-07-02

## 2020-07-02 VITALS — TEMPERATURE: 97 F | WEIGHT: 215 LBS | HEIGHT: 67 IN | BODY MASS INDEX: 33.74 KG/M2

## 2020-07-02 DIAGNOSIS — J06.9 VIRAL UPPER RESPIRATORY TRACT INFECTION: ICD-10-CM

## 2020-07-02 DIAGNOSIS — Z20.822 CLOSE EXPOSURE TO COVID-19 VIRUS: ICD-10-CM

## 2020-07-02 DIAGNOSIS — R05.9 COUGH: Primary | ICD-10-CM

## 2020-07-02 PROCEDURE — 99212 OFFICE O/P EST SF 10 MIN: CPT | Performed by: NURSE PRACTITIONER

## 2020-07-02 RX ORDER — FAMOTIDINE 40 MG/1
40 TABLET, FILM COATED ORAL DAILY
COMMUNITY
Start: 2020-04-16

## 2020-07-02 NOTE — TELEPHONE ENCOUNTER
Pt's wife Robe Running returned call and states that on Saturday, 6/27 her  was with her friend all day and they ate together at a restaurant. Nobody was wearing masks.      She states that on Tues, 6/30 her friend started with sx of cough and went to [facility]

## 2020-07-02 NOTE — TELEPHONE ENCOUNTER
Telephone encounter scheduled this afternoon.     Future Appointments   Date Time Provider Kori Kruger   7/2/2020  3:45 PM RADHA Garcia [facility] [place] [facility] [facility]   7/15/2020 10:15 AM RADHA Harris [facility] [place] [facility] [place]     Will

## 2020-07-02 NOTE — TELEPHONE ENCOUNTER
Patient was exposed to positive covid person and now is feeling sick, wants to know if Mock can put a order in for him to get tested at [address]

## 2020-07-02 NOTE — PROGRESS NOTES
CHIEF COMPLAINT:   Patient presents with:  Cough  Marshall Sinks  verbally consents to a Virtual/Telephone Check-In service on 7/2/2020.     Patient understands and accepts financial responsibility for any deductible, co-insurance and/or co-pays associ °F (36.1 °C)   Ht 67\"   Wt 215 lb (97.5 kg)   BMI 33.67 kg/m²   GENERAL: well developed, well nourished,in no apparent distress  HEAD: Per patient–no tenderness on palpation of maxillary sinuses  EARS:.   Unable to assess via phone visit no pain per patien

## 2020-07-03 ENCOUNTER — LAB ENCOUNTER (OUTPATIENT)
Dept: LAB | Facility: HOSPITAL | Age: 55
End: 2020-07-03
Attending: NURSE PRACTITIONER
Payer: COMMERCIAL

## 2020-07-03 DIAGNOSIS — R05.9 COUGH: ICD-10-CM

## 2020-07-04 ENCOUNTER — TELEPHONE (OUTPATIENT)
Dept: FAMILY MEDICINE CLINIC | Facility: CLINIC | Age: 55
End: 2020-07-04

## 2020-07-04 LAB — SARS-COV-2 RNA RESP QL NAA+PROBE: DETECTED

## 2020-07-04 NOTE — TELEPHONE ENCOUNTER
Called patient - COVID-19 test is positive. Lungs feels better than yesterday- occasional cough- no fever   4 days with no fever. Discussed with patient he should not be on an airplane.   Also discussed with patient that the droplets from the cough sprea

## 2020-07-06 ENCOUNTER — TELEPHONE (OUTPATIENT)
Dept: FAMILY MEDICINE CLINIC | Facility: CLINIC | Age: 55
End: 2020-07-06

## 2020-07-06 DIAGNOSIS — U07.1 COVID-19: Primary | ICD-10-CM

## 2020-07-06 NOTE — TELEPHONE ENCOUNTER
Patient recently tested positive for COVID-19–please call patient for status update and see how he is feeling.

## 2020-07-06 NOTE — TELEPHONE ENCOUNTER
Patient states he is a little short of breath and  Nasal congestion. Otherwise if feeling ok. Patient states his employer is needing confirmation that he is free of [place]  Before he comes back to work.     Please enter order for patient to have repea

## 2020-07-06 NOTE — TELEPHONE ENCOUNTER
Order entered–patient must wait 15 days from his initial positive which was July 3–she may do it as early asSatu July 18 possibly on Monday, July 20.

## 2020-07-09 ENCOUNTER — TELEPHONE (OUTPATIENT)
Dept: FAMILY MEDICINE CLINIC | Facility: CLINIC | Age: 55
End: 2020-07-09

## 2020-07-09 NOTE — TELEPHONE ENCOUNTER
Spoke with pt regarding a condition update due to Covid. Pt states that he still has a deep cough but feels like there is less tightness in his chest. He does state that today he does notice some SOB when he walks up stairs.      Pt states that he is als

## 2020-07-11 ENCOUNTER — TELEPHONE (OUTPATIENT)
Dept: FAMILY MEDICINE CLINIC | Facility: CLINIC | Age: 55
End: 2020-07-11

## 2020-07-11 NOTE — TELEPHONE ENCOUNTER
Spoke with patient this morning regarding COVID condition and update. Patient reports he overall feels okay still experiencing some shortness of breath at times but he feels like it is not enough to go to the hospital and has not worsened.  States he still

## 2020-07-13 ENCOUNTER — TELEPHONE (OUTPATIENT)
Dept: FAMILY MEDICINE CLINIC | Facility: CLINIC | Age: 55
End: 2020-07-13

## 2020-07-13 NOTE — TELEPHONE ENCOUNTER
Patient states his lungs feel a little bit better than they did last week. Patient continues to have SOB on exertion along with body aches, cough, and fatigue. Patient denies fever, headache, sore throat, or chest pain.

## 2020-07-13 NOTE — TELEPHONE ENCOUNTER
Spoke with patient regarding PFT and recommendation below . Patient is scheduled to have COVID test 6/20/20.

## 2020-07-13 NOTE — TELEPHONE ENCOUNTER
Patient with covid. Cannot have PFT at this time. And would not make sense. Will need to wait till 2 negative covid tests, and 14 days from symptom onset.

## 2020-07-15 ENCOUNTER — TELEPHONE (OUTPATIENT)
Dept: FAMILY MEDICINE CLINIC | Facility: CLINIC | Age: 55
End: 2020-07-15

## 2020-07-15 NOTE — TELEPHONE ENCOUNTER
Spoke with patient. States overall he feels like he is doing better. States he still has the nonproductive cough and Irritation in his lungs as they are opening up. Patient states he will c/b if symptoms persist/increase.

## 2020-07-16 ENCOUNTER — TELEPHONE (OUTPATIENT)
Dept: FAMILY MEDICINE CLINIC | Facility: CLINIC | Age: 55
End: 2020-07-16

## 2020-07-16 DIAGNOSIS — U07.1 COVID-19: ICD-10-CM

## 2020-07-16 DIAGNOSIS — R06.02 SHORTNESS OF BREATH: Primary | ICD-10-CM

## 2020-07-16 NOTE — TELEPHONE ENCOUNTER
employer is requiring him to take a pulmonary function test, needs an order from Dr. Ruiz Cool   Date Time Provider Kori Kruger   7/20/2020  9:00 AM [facility]   7/29/2020  9:00 AM Alvaro Perla, APR

## 2020-07-17 ENCOUNTER — TELEPHONE (OUTPATIENT)
Dept: FAMILY MEDICINE CLINIC | Facility: CLINIC | Age: 55
End: 2020-07-17

## 2020-07-17 NOTE — TELEPHONE ENCOUNTER
Pt states that he has to schedule appt for PFT at [facility] , states thaty they won't schedule until pt takes the COVID-19 test and won't be able to schedule until 40 days after pt gets a negative result.  Pt wants to know if there's a different

## 2020-07-18 NOTE — TELEPHONE ENCOUNTER
Spoke with patient. States he is 15 days post Positive Covid testing. States since the beginning of the week he has been feeling much better.     Patient states he is having Covid testing done again on Monday to ensure it has resolved and has already sche

## 2020-07-18 NOTE — TELEPHONE ENCOUNTER
Likely can wait until 14 days after his covid symptoms and test resolved. Consider video visit to further explain process. And do a paitent check in.

## 2020-07-18 NOTE — TELEPHONE ENCOUNTER
Patient informed of the below recommendations. Patient states once he finds out if his Covid testing came back Negative, he will drop off the papers.

## 2020-07-20 ENCOUNTER — LAB ENCOUNTER (OUTPATIENT)
Dept: LAB | Facility: HOSPITAL | Age: 55
End: 2020-07-20
Attending: NURSE PRACTITIONER
Payer: COMMERCIAL

## 2020-07-20 DIAGNOSIS — U07.1 COVID-19: ICD-10-CM

## 2020-07-24 ENCOUNTER — TELEPHONE (OUTPATIENT)
Dept: FAMILY MEDICINE CLINIC | Facility: CLINIC | Age: 55
End: 2020-07-24

## 2020-07-24 LAB — SARS-COV-2 BY PCR: NOT DETECTED

## 2020-07-24 NOTE — TELEPHONE ENCOUNTER
Pt informed. Pt states he has been waiting for results- asked when they would be available. Called [facility]- pt specimen was sent out. Per lab- estimated completion date is 7/26. Pt was informed.

## 2020-07-24 NOTE — TELEPHONE ENCOUNTER
Pt's wife notified that the results are still pending for Covid test.    Original test 7/3/20 was positive. She would like to know if pt still needs to quarantine. She states that he has been asymptomatic x 5 days. Please advise.

## 2020-07-24 NOTE — TELEPHONE ENCOUNTER
It seems that pt has isotated since July 3rd. I would rec he continue isolation pending present covid test and review by DR. Isaac

## 2020-07-24 NOTE — TELEPHONE ENCOUNTER
Future appt:     Your appointments     Date & Time Appointment Department [facility])    Jul 29, 2020  9:00 AM CDT Exam - Established with Glenda Mcallister [address] [place] [address], [place])            [facility]

## 2020-07-27 RX ORDER — ESOMEPRAZOLE MAGNESIUM 40 MG/1
40 CAPSULE, DELAYED RELEASE ORAL
Qty: 90 CAPSULE | Refills: 0 | Status: SHIPPED | OUTPATIENT
Start: 2020-07-27

## 2020-07-29 ENCOUNTER — TELEPHONE (OUTPATIENT)
Dept: FAMILY MEDICINE CLINIC | Facility: CLINIC | Age: 55
End: 2020-07-29

## 2020-07-29 ENCOUNTER — HOSPITAL ENCOUNTER (OUTPATIENT)
Dept: GENERAL RADIOLOGY | Age: 55
Discharge: HOME | End: 2020-07-29
Attending: NURSE PRACTITIONER
Payer: COMMERCIAL

## 2020-07-29 ENCOUNTER — OFFICE VISIT (OUTPATIENT)
Dept: FAMILY MEDICINE CLINIC | Facility: CLINIC | Age: 55
End: 2020-07-29
Payer: COMMERCIAL

## 2020-07-29 VITALS
HEART RATE: 67 BPM | HEIGHT: 67 IN | DIASTOLIC BLOOD PRESSURE: 72 MMHG | BODY MASS INDEX: 33.9 KG/M2 | WEIGHT: 216 LBS | RESPIRATION RATE: 16 BRPM | OXYGEN SATURATION: 97 % | SYSTOLIC BLOOD PRESSURE: 128 MMHG | TEMPERATURE: 98 F

## 2020-07-29 DIAGNOSIS — R06.02 SHORTNESS OF BREATH: Primary | ICD-10-CM

## 2020-07-29 DIAGNOSIS — R06.02 SHORTNESS OF BREATH: ICD-10-CM

## 2020-07-29 LAB
ALBUMIN SERPL-MCNC: 3.8 G/DL (ref 3.4–5)
ALBUMIN/GLOB SERPL: 0.9 {RATIO} (ref 1–2)
ALP LIVER SERPL-CCNC: 83 U/L (ref 45–117)
ALT SERPL-CCNC: 37 U/L (ref 16–61)
ANION GAP SERPL CALC-SCNC: 0 MMOL/L (ref 0–18)
AST SERPL-CCNC: 24 U/L (ref 15–37)
BASOPHILS # BLD AUTO: 0.02 X10(3) UL (ref 0–0.2)
BASOPHILS NFR BLD AUTO: 0.4 %
BILIRUB SERPL-MCNC: 0.4 MG/DL (ref 0.1–2)
BUN BLD-MCNC: 13 MG/DL (ref 7–18)
BUN/CREAT SERPL: 11.3 (ref 10–20)
CALCIUM BLD-MCNC: 9.1 MG/DL (ref 8.5–10.1)
CHLORIDE SERPL-SCNC: 108 MMOL/L (ref 98–112)
CO2 SERPL-SCNC: 29 MMOL/L (ref 21–32)
CREAT BLD-MCNC: 1.15 MG/DL (ref 0.7–1.3)
DEPRECATED RDW RBC AUTO: 46.6 FL (ref 35.1–46.3)
EOSINOPHIL # BLD AUTO: 0.09 X10(3) UL (ref 0–0.7)
EOSINOPHIL NFR BLD AUTO: 1.8 %
ERYTHROCYTE [DISTWIDTH] IN BLOOD BY AUTOMATED COUNT: 13.2 % (ref 11–15)
GLOBULIN PLAS-MCNC: 4.1 G/DL (ref 2.8–4.4)
GLUCOSE BLD-MCNC: 119 MG/DL (ref 70–99)
HCT VFR BLD AUTO: 42.1 % (ref 39–53)
HGB BLD-MCNC: 13.4 G/DL (ref 13–17.5)
IMM GRANULOCYTES # BLD AUTO: 0.01 X10(3) UL (ref 0–1)
IMM GRANULOCYTES NFR BLD: 0.2 %
LYMPHOCYTES # BLD AUTO: 2.63 X10(3) UL (ref 1–4)
LYMPHOCYTES NFR BLD AUTO: 53.7 %
M PROTEIN MFR SERPL ELPH: 7.9 G/DL (ref 6.4–8.2)
MCH RBC QN AUTO: 31.2 PG (ref 26–34)
MCHC RBC AUTO-ENTMCNC: 31.8 G/DL (ref 31–37)
MCV RBC AUTO: 98.1 FL (ref 80–100)
MONOCYTES # BLD AUTO: 0.49 X10(3) UL (ref 0.1–1)
MONOCYTES NFR BLD AUTO: 10 %
NEUTROPHILS # BLD AUTO: 1.66 X10 (3) UL (ref 1.5–7.7)
NEUTROPHILS # BLD AUTO: 1.66 X10(3) UL (ref 1.5–7.7)
NEUTROPHILS NFR BLD AUTO: 33.9 %
OSMOLALITY SERPL CALC.SUM OF ELEC: 285 MOSM/KG (ref 275–295)
PATIENT FASTING Y/N/NP: NO
PLATELET # BLD AUTO: 295 10(3)UL (ref 150–450)
POTASSIUM SERPL-SCNC: 4 MMOL/L (ref 3.5–5.1)
RBC # BLD AUTO: 4.29 X10(6)UL (ref 4.3–5.7)
SODIUM SERPL-SCNC: 137 MMOL/L (ref 136–145)
WBC # BLD AUTO: 4.9 X10(3) UL (ref 4–11)

## 2020-07-29 PROCEDURE — 80053 COMPREHEN METABOLIC PANEL: CPT | Performed by: NURSE PRACTITIONER

## 2020-07-29 PROCEDURE — 99214 OFFICE O/P EST MOD 30 MIN: CPT | Performed by: NURSE PRACTITIONER

## 2020-07-29 PROCEDURE — 85025 COMPLETE CBC W/AUTO DIFF WBC: CPT | Performed by: NURSE PRACTITIONER

## 2020-07-29 PROCEDURE — 3074F SYST BP LT 130 MM HG: CPT | Performed by: NURSE PRACTITIONER

## 2020-07-29 PROCEDURE — 3008F BODY MASS INDEX DOCD: CPT | Performed by: NURSE PRACTITIONER

## 2020-07-29 PROCEDURE — 3078F DIAST BP <80 MM HG: CPT | Performed by: NURSE PRACTITIONER

## 2020-07-29 PROCEDURE — 71046 X-RAY EXAM CHEST 2 VIEWS: CPT | Performed by: NURSE PRACTITIONER

## 2020-07-29 NOTE — TELEPHONE ENCOUNTER
----- Message from RADHA Restrepo sent at 7/29/2020  6:41 PM CDT -----  Please notify the patient his labs are returned. Renal function normal range, glucose mildly elevated but I do not believe this was a fasting lab specimen.   Electrolytes and r

## 2020-07-29 NOTE — PROGRESS NOTES
[facility] [place]  PROGRESS NOTE  Chief Complaint:   Patient presents with:  Complete Form: Complete forms post Covid       HPI:   This is a 63year old male coming in for return to work form post COVID infection.      Patient needing return to Patient does not qualify to have social determinant information on file (likely too young). Social History Narrative      Lives at home with wife. Children:  3. All grown. Works a lot. Wife to make decisions if unable.        Ful or tingling in the extremities,change in bowel or bladder control. HEMATOLOGIC:  Denies anemia, bleeding or bruising. LYMPHATICS:  Denies enlarged nodes or history of splenectomy. PSYCHIATRIC:  Denies depression or anxiety.   ENDOCRINOLOGIC:  Denies exce testing on July 28 with negative infection. Patient still with ongoing symptoms of shortness of breath and cough. Patient has not been using his rescue inhaler for his symptoms.   Chest x-ray and labs today; chest x-ray without acute findings, similar res questions, complications, allergies, or worsening or changing symptoms. Patient is to call with any side effects or complications from the treatments as a result of today.      Meds & Refills for this Visit:  Requested Prescriptions      No prescriptions r

## 2020-07-29 NOTE — PATIENT INSTRUCTIONS
Will obtain chest x-ray today. Labs today. Start taking your albuterol inhaler 2 puffs every 4 hours for the next 24-48 hours. Will need to see you back in two days to assess symptoms. At that time, will assess light duty clearance.   Full asthma form t

## 2020-07-29 NOTE — TELEPHONE ENCOUNTER
----- Message from RADHA Franco sent at 7/29/2020 11:02 AM CDT -----  Please notify the patient his chest xray was negative. No signs of pneumonia on x-ray, no significant findings when compared to x-ray completed in February.   Will await labs,

## 2020-07-30 ENCOUNTER — TELEPHONE (OUTPATIENT)
Dept: FAMILY MEDICINE CLINIC | Facility: CLINIC | Age: 55
End: 2020-07-30

## 2020-07-30 NOTE — TELEPHONE ENCOUNTER
answered YES to having COVID   + on 7/3  Neg on 7/20       YES  to 1) cough  2)  SOB    appt 7/31 @ 845am      please triage         Gabby Mclaughlin, 07/30/20, 11:12 AM

## 2020-07-31 ENCOUNTER — OFFICE VISIT (OUTPATIENT)
Dept: FAMILY MEDICINE CLINIC | Facility: CLINIC | Age: 55
End: 2020-07-31
Payer: COMMERCIAL

## 2020-07-31 VITALS
RESPIRATION RATE: 20 BRPM | DIASTOLIC BLOOD PRESSURE: 80 MMHG | TEMPERATURE: 98 F | OXYGEN SATURATION: 97 % | BODY MASS INDEX: 33.74 KG/M2 | WEIGHT: 215 LBS | HEIGHT: 67 IN | SYSTOLIC BLOOD PRESSURE: 114 MMHG | HEART RATE: 84 BPM

## 2020-07-31 DIAGNOSIS — R06.02 SHORTNESS OF BREATH: Primary | ICD-10-CM

## 2020-07-31 PROBLEM — U07.1 COVID-19 VIRUS INFECTION: Status: ACTIVE | Noted: 2020-07-31

## 2020-07-31 PROCEDURE — 3008F BODY MASS INDEX DOCD: CPT | Performed by: NURSE PRACTITIONER

## 2020-07-31 PROCEDURE — 99213 OFFICE O/P EST LOW 20 MIN: CPT | Performed by: NURSE PRACTITIONER

## 2020-07-31 PROCEDURE — 3079F DIAST BP 80-89 MM HG: CPT | Performed by: NURSE PRACTITIONER

## 2020-07-31 PROCEDURE — 3074F SYST BP LT 130 MM HG: CPT | Performed by: NURSE PRACTITIONER

## 2020-07-31 RX ORDER — PREDNISONE 20 MG/1
20 TABLET ORAL 2 TIMES DAILY
Qty: 10 TABLET | Refills: 0 | Status: SHIPPED | OUTPATIENT
Start: 2020-07-31 | End: 2020-08-05

## 2020-07-31 NOTE — PATIENT INSTRUCTIONS
Albuterol HFA inhaler 2 puffs every four hours while awake for the next day; use 15 minutes prior to exercise or exertion. Prednisone 20mg twice a day for five days; take with food. ER precautions for worsening symptoms.    Light duty form completed, ok

## 2020-08-03 ENCOUNTER — TELEPHONE (OUTPATIENT)
Dept: FAMILY MEDICINE CLINIC | Facility: CLINIC | Age: 55
End: 2020-08-03

## 2020-08-03 NOTE — TELEPHONE ENCOUNTER
in am lungs still tight, after albuteral and a walk jog two miles, lungs feel less tight, evening lungs start to tighten up a little bit, albuteral by it self, does losen the lungs  No future appointments.

## 2020-08-03 NOTE — TELEPHONE ENCOUNTER
Patient informed of the below recommendations. Patient states his PFT is scheduled for Friday, 8/14/2020. First available appt with Dr. Ashley Junior is Thursday, 9/10/2020.   Patient wondering if he can f/u with Maame Sanchez and then maybe Dr. Ashley Junior could cosign the

## 2020-08-03 NOTE — TELEPHONE ENCOUNTER
We will have patient continue his current treatment as outlined. Finish full course of steroids. Call with an update on symptoms in 2 days.   Ensure patient has a follow-up scheduled with Dr. Keyla Ann for after his PFTs have been completed for return to work

## 2020-08-03 NOTE — TELEPHONE ENCOUNTER
Patient states when he first wakes up in the morning his lungs feel tight. States after exercising his lungs seem to open up. States then at night he starts feeling tight again. Patient states he is taking the Prednisone as prescribed.   Patient denies h

## 2020-08-03 NOTE — TELEPHONE ENCOUNTER
This is not up to me, this would be up to Dr. Rick Sneed but I do not advise scheduling with me and then asking if Dr. Rick Sneed will cosign in the event that she declines.   I also believe that he said that his employer required a physician to sign off on the paperwo

## 2020-08-05 ENCOUNTER — TELEPHONE (OUTPATIENT)
Dept: FAMILY MEDICINE CLINIC | Facility: CLINIC | Age: 55
End: 2020-08-05

## 2020-08-05 DIAGNOSIS — R06.02 SHORTNESS OF BREATH: Primary | ICD-10-CM

## 2020-08-05 RX ORDER — PREDNISONE 10 MG/1
10 TABLET ORAL 2 TIMES DAILY
Qty: 4 TABLET | Refills: 0 | Status: SHIPPED | OUTPATIENT
Start: 2020-08-05 | End: 2020-08-07

## 2020-08-05 NOTE — TELEPHONE ENCOUNTER
Pt calling to update you on his breathing. Pt states that at night his chest feels tight but when he uses the Albuterol in the morning and then goes for a walk/jog that it loosens up.     He states that he feels better today and feels like he can \"suck in

## 2020-08-05 NOTE — TELEPHONE ENCOUNTER
Please have patient continue with current treatment. Please have patient confirm with his employer whether nurse practitioner can sign his medical release forms.   He had indicated to me previously that his employer said the appointment HAS TO BE with a

## 2020-08-05 NOTE — TELEPHONE ENCOUNTER
Prednisone dose extended though at lower dose. Will take 10mg twice a day for the next two days for a total of seven days on prednisone.

## 2020-08-05 NOTE — TELEPHONE ENCOUNTER
Pt notified and verbalized understanding. He will check with his employer regarding form. He would like to know if you can extend his Prednisone script. Please advise.

## 2020-08-18 ENCOUNTER — TELEPHONE (OUTPATIENT)
Dept: FAMILY MEDICINE CLINIC | Facility: CLINIC | Age: 55
End: 2020-08-18

## 2020-08-18 NOTE — TELEPHONE ENCOUNTER
has an appt tomorrow, wants to know if pft  results are back from [facility].  also said yes to cough and shortness of breath on travel screeing questions

## 2020-08-19 ENCOUNTER — OFFICE VISIT (OUTPATIENT)
Dept: FAMILY MEDICINE CLINIC | Facility: CLINIC | Age: 55
End: 2020-08-19
Payer: COMMERCIAL

## 2020-08-19 VITALS
TEMPERATURE: 97 F | DIASTOLIC BLOOD PRESSURE: 82 MMHG | OXYGEN SATURATION: 99 % | BODY MASS INDEX: 33.43 KG/M2 | RESPIRATION RATE: 18 BRPM | WEIGHT: 213 LBS | SYSTOLIC BLOOD PRESSURE: 126 MMHG | HEIGHT: 67 IN | HEART RATE: 65 BPM

## 2020-08-19 DIAGNOSIS — J45.20 MILD INTERMITTENT ASTHMA WITHOUT COMPLICATION: Primary | ICD-10-CM

## 2020-08-19 PROCEDURE — 3074F SYST BP LT 130 MM HG: CPT | Performed by: FAMILY MEDICINE

## 2020-08-19 PROCEDURE — 3008F BODY MASS INDEX DOCD: CPT | Performed by: FAMILY MEDICINE

## 2020-08-19 PROCEDURE — 99213 OFFICE O/P EST LOW 20 MIN: CPT | Performed by: FAMILY MEDICINE

## 2020-08-19 PROCEDURE — 3079F DIAST BP 80-89 MM HG: CPT | Performed by: FAMILY MEDICINE

## 2020-08-19 NOTE — TELEPHONE ENCOUNTER
Dr Joshua Job can you please advise.   Future Appointments   Date Time Provider Kori Kruger   8/19/2020  9:40 AM Claudell Public, MD [facility] [place] [facility]

## 2020-08-19 NOTE — PROGRESS NOTES
[facility] [place]  PROGRESS NOTE  Chief Complaint:   Patient presents with:  Forms Completion  Other: PFT results       HPI:   This is a 63year old male with asthma presents for follow-up.   Patient was tested positive for COVID in early July, Medications   Medication Sig Dispense Refill   • Esomeprazole Magnesium (NEXIUM) 40 MG Oral Capsule Delayed Release Take 1 capsule (40 mg total) by mouth every morning before breakfast. 90 capsule 0   • famotidine 40 MG Oral Tab Take 40 mg by mouth daily. 67\".    Weight as of this encounter: 213 lb (96.6 kg). Vital signs reviewed. Physical Exam:  GEN:  Patient is alert, awake and oriented, well developed, well nourished, no acute distress. EYES:  Sclera anicteric, conjunctiva normal, PERRLA, EOMI.   NE treatments as a result of today.      Problem List:  Patient Active Problem List:     Asthma     Benign enlargement of prostate     Disorder of lipid metabolism     Insomnia     Leukocytosis     Cardiomegaly     Obstructive sleep apnea     Osteoarthrosis

## 2020-08-19 NOTE — PATIENT INSTRUCTIONS
Form filled. Continue with inhaler as needed   See pulmonologist Dr Vira Torres. Return to clinic if any concern.

## 2020-08-29 ENCOUNTER — TELEPHONE (OUTPATIENT)
Dept: FAMILY MEDICINE CLINIC | Facility: CLINIC | Age: 55
End: 2020-08-29

## 2020-08-29 NOTE — TELEPHONE ENCOUNTER
Patient had normal PFT. Unclear why he is so short of breath when he is running. He wonders if it is from covid but was short of breath from before covid. He has been starting cardio and started slowly.  And going 2.5 miles and working his way up to

## 2020-10-06 ENCOUNTER — TELEPHONE (OUTPATIENT)
Dept: FAMILY MEDICINE CLINIC | Facility: CLINIC | Age: 55
End: 2020-10-06

## 2020-10-06 NOTE — TELEPHONE ENCOUNTER
Received a fax from Dr. Marleni Wright office of pre-op orders. Pt is scheduled to have surgery on 10/21/20. Pt has an appt on 10/14 with La[facility] for a sleep consult but needs a pre-op visit as well.     I looked at your schedule but you are overbooked

## 2020-10-08 ENCOUNTER — TELEPHONE (OUTPATIENT)
Dept: FAMILY MEDICINE CLINIC | Facility: CLINIC | Age: 55
End: 2020-10-08

## 2020-10-08 NOTE — TELEPHONE ENCOUNTER
appt schedule.     Future Appointments   Date Time Provider Kori Saskia   10/15/2020  8:50 AM Zayda Redding MD [facility] [place] [facility]

## 2020-10-15 ENCOUNTER — OFFICE VISIT (OUTPATIENT)
Dept: FAMILY MEDICINE CLINIC | Facility: CLINIC | Age: 55
End: 2020-10-15
Payer: COMMERCIAL

## 2020-10-15 ENCOUNTER — LAB ENCOUNTER (OUTPATIENT)
Dept: LAB | Age: 55
End: 2020-10-15
Attending: FAMILY MEDICINE
Payer: COMMERCIAL

## 2020-10-15 VITALS
DIASTOLIC BLOOD PRESSURE: 80 MMHG | TEMPERATURE: 97 F | WEIGHT: 209.81 LBS | RESPIRATION RATE: 16 BRPM | HEIGHT: 67 IN | BODY MASS INDEX: 32.93 KG/M2 | HEART RATE: 57 BPM | SYSTOLIC BLOOD PRESSURE: 128 MMHG | OXYGEN SATURATION: 98 %

## 2020-10-15 DIAGNOSIS — M25.511 RIGHT SHOULDER PAIN, UNSPECIFIED CHRONICITY: Primary | ICD-10-CM

## 2020-10-15 DIAGNOSIS — U07.1 COVID-19: ICD-10-CM

## 2020-10-15 DIAGNOSIS — M25.511 RIGHT SHOULDER PAIN, UNSPECIFIED CHRONICITY: ICD-10-CM

## 2020-10-15 DIAGNOSIS — Z01.818 PREOPERATIVE CLEARANCE: ICD-10-CM

## 2020-10-15 PROCEDURE — 80053 COMPREHEN METABOLIC PANEL: CPT | Performed by: FAMILY MEDICINE

## 2020-10-15 PROCEDURE — 85025 COMPLETE CBC W/AUTO DIFF WBC: CPT | Performed by: FAMILY MEDICINE

## 2020-10-15 PROCEDURE — 93000 ELECTROCARDIOGRAM COMPLETE: CPT | Performed by: FAMILY MEDICINE

## 2020-10-15 PROCEDURE — 3008F BODY MASS INDEX DOCD: CPT | Performed by: FAMILY MEDICINE

## 2020-10-15 PROCEDURE — 3074F SYST BP LT 130 MM HG: CPT | Performed by: FAMILY MEDICINE

## 2020-10-15 PROCEDURE — 81003 URINALYSIS AUTO W/O SCOPE: CPT | Performed by: FAMILY MEDICINE

## 2020-10-15 PROCEDURE — 99214 OFFICE O/P EST MOD 30 MIN: CPT | Performed by: FAMILY MEDICINE

## 2020-10-15 PROCEDURE — 3079F DIAST BP 80-89 MM HG: CPT | Performed by: FAMILY MEDICINE

## 2020-10-15 PROCEDURE — 36415 COLL VENOUS BLD VENIPUNCTURE: CPT | Performed by: FAMILY MEDICINE

## 2020-10-15 NOTE — PROGRESS NOTES
[facility] [place]  PROGRESS NOTE        HPI:   This is a 63year old male coming in for Patient presents with:  Pre-Op Exam: Right Shoulder surgery with Kailee Arzate. 10/21/2020.      HPI  Shoulder is quite painful and has excellerated over t 1.50 - 7.70 x10(3) uL    Lymphocyte Absolute 2.63 1.00 - 4.00 x10(3) uL    Monocyte Absolute 0.49 0.10 - 1.00 x10(3) uL    Eosinophil Absolute 0.09 0.00 - 0.70 x10(3) uL    Basophil Absolute 0.02 0.00 - 0.20 x10(3) uL    Immature Granulocyte Absolute 0.01 180 capsule 1   • Albuterol Sulfate  (90 Base) MCG/ACT Inhalation Aero Soln 1-2 puffs every 4 to 6 hrs as needed     • B Complex-C-Folic Acid (VITALINE BIOTIN FORTE) 0.8 MG Oral Tab Take 1 tablet by mouth daily.      • Cholecalciferol (VITAMIN D) 100 appears well-developed and well-nourished. HENT:   Head: Normocephalic and atraumatic. Right Ear: External ear normal.   Left Ear: External ear normal.   Mouth/Throat: Oropharynx is clear and moist.   Mal 4 tonsils 0   Small oral excursion opening. Discussed risks and benefits of oeprative intervention. Including risk of Heart attack, Stroke and possible death. Patient a reasonable canididate for operative intervention.     ASAClass:  2  Airway Class: 4        No problem-specific Assessment & Jason

## 2020-10-16 ENCOUNTER — TELEPHONE (OUTPATIENT)
Dept: FAMILY MEDICINE CLINIC | Facility: CLINIC | Age: 55
End: 2020-10-16

## 2020-10-16 NOTE — TELEPHONE ENCOUNTER
----- Message from Kraig Mayen MD sent at 10/15/2020  5:20 PM CDT -----  Labs look good.    Fax note, ekg and labs to surgeon, and preop   Cleared for surgery

## 2020-10-16 NOTE — TELEPHONE ENCOUNTER
----- Message from Donnie Epstein MD sent at 10/15/2020  5:21 PM CDT -----  Fax to surgery and preop

## 2020-10-16 NOTE — TELEPHONE ENCOUNTER
All pre-op testing and notes faxed to Henry Machado at [facility] for Dr. Peter Sheth at [phone]

## 2020-10-19 ENCOUNTER — TELEPHONE (OUTPATIENT)
Dept: FAMILY MEDICINE CLINIC | Facility: CLINIC | Age: 55
End: 2020-10-19

## 2020-10-19 NOTE — TELEPHONE ENCOUNTER
Spoke with Josesito Stephenson at [facility]- asked for copy of CXR report from 7/29/20. Faxed to contact provided.

## 2020-10-21 ENCOUNTER — TELEPHONE (OUTPATIENT)
Dept: FAMILY MEDICINE CLINIC | Facility: CLINIC | Age: 55
End: 2020-10-21

## 2020-10-21 DIAGNOSIS — Z20.822 EXPOSURE TO COVID-19 VIRUS: Primary | ICD-10-CM

## 2020-10-21 NOTE — TELEPHONE ENCOUNTER
Patient called to let Dr. Kerri Gallo know he tested positive again for Covid, Asymptomatic and was tested for surgery. Patient had previously tested positive in July and had a couple negative tests after.  Shoulder surgery has been rescheduled

## 2020-10-22 NOTE — TELEPHONE ENCOUNTER
Pt notified and verbalized understanding. He would like to know when he can have another Covid test because he wonders if it was a false positive because he had Covid in July.      Order faxed to [facility] at [phone] and to the [facility] Referral Dept for aut

## 2020-10-22 NOTE — TELEPHONE ENCOUNTER
quarrantine for 10 days and then let's send order for ct chest plain to [facility]. Call if worsening symptoms.

## 2020-10-22 NOTE — TELEPHONE ENCOUNTER
Please call the covid center. I want it done in [facility] and they can retest and do not have any such rules. Thanks. Have them replace my order.

## 2020-10-23 NOTE — TELEPHONE ENCOUNTER
Per [facility]-  The 90 day restest is not in effect for   [facility]. Patient needs new order.   Trini Holliday, 10/23/20, 10:49 AM

## 2020-11-05 ENCOUNTER — TELEPHONE (OUTPATIENT)
Dept: FAMILY MEDICINE CLINIC | Facility: CLINIC | Age: 55
End: 2020-11-05

## 2020-11-05 NOTE — TELEPHONE ENCOUNTER
Is hospitalist seeing him in hospital or no? I would recommend a long test.   Attempted to call DR. Oh.

## 2020-11-05 NOTE — TELEPHONE ENCOUNTER
Beauty Echevaria (wife) states that the patient had shoulder surgery yesterday with Dr. Woo Douglas. He had to spend the night due to an elevated bp , they tested him for Covid and he tested positive, but has no symptoms.  She states that they used a rapid test and she

## 2020-11-05 NOTE — TELEPHONE ENCOUNTER
Wife Sari Cox) informed of recommendations as per Dr. Ary Merino. She states that her  is being discharged right now and will ask if a long covid test can be done before he goes. She did ask where she can go to have a covid test for him.     I recomm

## 2020-11-25 ENCOUNTER — TELEPHONE (OUTPATIENT)
Dept: FAMILY MEDICINE CLINIC | Facility: CLINIC | Age: 55
End: 2020-11-25

## 2020-11-25 DIAGNOSIS — U07.1 COVID-19: ICD-10-CM

## 2020-11-25 DIAGNOSIS — R07.89 CHEST DISCOMFORT: Primary | ICD-10-CM

## 2020-12-03 ENCOUNTER — TELEPHONE (OUTPATIENT)
Dept: FAMILY MEDICINE CLINIC | Facility: CLINIC | Age: 55
End: 2020-12-03

## 2020-12-03 NOTE — TELEPHONE ENCOUNTER
Has not yet had prior authorization done. Discussed with patient will try to schedule through [facility] tomorrow will call. Prior authorization request sent to referral department.

## 2021-01-04 ENCOUNTER — OFFICE VISIT (OUTPATIENT)
Dept: FAMILY MEDICINE CLINIC | Facility: CLINIC | Age: 56
End: 2021-01-04
Payer: COMMERCIAL

## 2021-01-04 VITALS
OXYGEN SATURATION: 96 % | DIASTOLIC BLOOD PRESSURE: 68 MMHG | WEIGHT: 213.19 LBS | BODY MASS INDEX: 33 KG/M2 | HEART RATE: 73 BPM | SYSTOLIC BLOOD PRESSURE: 112 MMHG | RESPIRATION RATE: 16 BRPM | TEMPERATURE: 98 F

## 2021-01-04 DIAGNOSIS — R68.84 JAW PAIN: ICD-10-CM

## 2021-01-04 DIAGNOSIS — H92.01 RIGHT EAR PAIN: Primary | ICD-10-CM

## 2021-01-04 PROCEDURE — 3074F SYST BP LT 130 MM HG: CPT | Performed by: NURSE PRACTITIONER

## 2021-01-04 PROCEDURE — 99213 OFFICE O/P EST LOW 20 MIN: CPT | Performed by: NURSE PRACTITIONER

## 2021-01-04 PROCEDURE — 3078F DIAST BP <80 MM HG: CPT | Performed by: NURSE PRACTITIONER

## 2021-01-04 RX ORDER — AMOXICILLIN AND CLAVULANATE POTASSIUM 875; 125 MG/1; MG/1
1 TABLET, FILM COATED ORAL 2 TIMES DAILY
Qty: 20 TABLET | Refills: 0 | Status: SHIPPED | OUTPATIENT
Start: 2021-01-04 | End: 2021-01-14

## 2021-01-04 NOTE — PATIENT INSTRUCTIONS
Directed to take antibiotics until gone. Recommend to eat yogurt or take probiotic daily while on antibiotic, but not the same time as the antibiotic. Treat symptoms as needed. Take antihistamine such as Claritin, Allegra, or Zyrtec.    Return to clinic i

## 2021-01-04 NOTE — PROGRESS NOTES
HPI:    Patient ID: Lula Holder is a 63year old male. HPI     Right ear pain for the past 2 weeks. Now spread to jaw. No sore throat. No fever. Feels some drainage to the ear. Some nasal congestion. Has allergies - taking generic allegra.    Sta Normocephalic and atraumatic. Right Ear: Hearing, tympanic membrane, external ear and ear canal normal.   Left Ear: Hearing, tympanic membrane, external ear and ear canal normal.   Nose: Mucosal edema (trace) present.    Mouth/Throat: Uvula is midline and

## 2021-01-21 ENCOUNTER — OFFICE VISIT (OUTPATIENT)
Dept: FAMILY MEDICINE CLINIC | Facility: CLINIC | Age: 56
End: 2021-01-21
Payer: COMMERCIAL

## 2021-01-21 VITALS
HEART RATE: 68 BPM | HEIGHT: 67 IN | DIASTOLIC BLOOD PRESSURE: 80 MMHG | SYSTOLIC BLOOD PRESSURE: 118 MMHG | WEIGHT: 211.38 LBS | TEMPERATURE: 97 F | BODY MASS INDEX: 33.18 KG/M2 | RESPIRATION RATE: 18 BRPM | OXYGEN SATURATION: 97 %

## 2021-01-21 DIAGNOSIS — Z12.5 SCREENING FOR MALIGNANT NEOPLASM OF PROSTATE: ICD-10-CM

## 2021-01-21 DIAGNOSIS — Z00.00 WELLNESS EXAMINATION: Primary | ICD-10-CM

## 2021-01-21 DIAGNOSIS — E55.9 VITAMIN D DEFICIENCY: ICD-10-CM

## 2021-01-21 DIAGNOSIS — E78.5 HYPERLIPIDEMIA, UNSPECIFIED HYPERLIPIDEMIA TYPE: ICD-10-CM

## 2021-01-21 DIAGNOSIS — R68.84 JAW PAIN: ICD-10-CM

## 2021-01-21 DIAGNOSIS — E53.8 ADENOSYLCOBALAMIN SYNTHESIS DEFECT: ICD-10-CM

## 2021-01-21 PROBLEM — Z98.890 S/P RIGHT ROTATOR CUFF REPAIR: Status: ACTIVE | Noted: 2020-11-04

## 2021-01-21 PROBLEM — U07.1 COVID-19 VIRUS INFECTION: Status: RESOLVED | Noted: 2020-07-31 | Resolved: 2021-01-21

## 2021-01-21 PROCEDURE — 90670 PCV13 VACCINE IM: CPT | Performed by: FAMILY MEDICINE

## 2021-01-21 PROCEDURE — 3074F SYST BP LT 130 MM HG: CPT | Performed by: FAMILY MEDICINE

## 2021-01-21 PROCEDURE — 3079F DIAST BP 80-89 MM HG: CPT | Performed by: FAMILY MEDICINE

## 2021-01-21 PROCEDURE — 3008F BODY MASS INDEX DOCD: CPT | Performed by: FAMILY MEDICINE

## 2021-01-21 PROCEDURE — 99214 OFFICE O/P EST MOD 30 MIN: CPT | Performed by: FAMILY MEDICINE

## 2021-01-21 PROCEDURE — 99396 PREV VISIT EST AGE 40-64: CPT | Performed by: FAMILY MEDICINE

## 2021-01-21 PROCEDURE — 90471 IMMUNIZATION ADMIN: CPT | Performed by: FAMILY MEDICINE

## 2021-01-21 RX ORDER — TAMSULOSIN HYDROCHLORIDE 0.4 MG/1
0.4 CAPSULE ORAL DAILY
Qty: 30 CAPSULE | Refills: 5 | Status: SHIPPED | OUTPATIENT
Start: 2021-01-21 | End: 2021-07-12

## 2021-01-21 RX ORDER — LORATADINE 10 MG/1
10 TABLET ORAL DAILY
COMMUNITY

## 2021-01-21 RX ORDER — CLONAZEPAM 0.5 MG/1
0.5 TABLET ORAL 2 TIMES DAILY PRN
Qty: 30 TABLET | Refills: 0 | Status: SHIPPED | OUTPATIENT
Start: 2021-01-21 | End: 2021-07-21

## 2021-01-21 NOTE — PROGRESS NOTES
HPI:   Noemi Glasgow is a 63year old male who presents for an Annual Health Visit. Doing well, overall.       Hasn't been able to use oral airway for about a month and thought he had an ear infection pulling on his jaw, and now thinks that hi Date   • ARTHROSCOPY, SHOULDER, SURGI      right   • OTHER      L shoulder    • OTHER SURGICAL HISTORY     • OTHER SURGICAL HISTORY Right 11/ 4/2020    Repaired right shoulder damage   • REPAIR, SLAP LESION      Left shoulder   • SINUS SURGERY          Fam kg/m². Physical Exam   Constitutional: He is oriented to person, place, and time. He appears well-developed and well-nourished. HENT:   Head: Normocephalic and atraumatic.    Right Ear: External ear normal.   Left Ear: External ear normal.   Mouth/Thro Future    Hyperlipidemia, unspecified hyperlipidemia type  -     CBC WITH DIFFERENTIAL WITH PLATELET; Future  -     CK CREATINE KINASE (NOT CREATININE); Future  -     COMP METABOLIC PANEL (14); Future  -     LIPID PANEL;  Future  -     FERRITIN; Future  -

## 2021-01-28 ENCOUNTER — LAB ENCOUNTER (OUTPATIENT)
Dept: LAB | Age: 56
End: 2021-01-28
Attending: FAMILY MEDICINE
Payer: COMMERCIAL

## 2021-01-28 DIAGNOSIS — Z00.00 WELLNESS EXAMINATION: ICD-10-CM

## 2021-01-28 DIAGNOSIS — E78.5 HYPERLIPIDEMIA, UNSPECIFIED HYPERLIPIDEMIA TYPE: ICD-10-CM

## 2021-01-28 DIAGNOSIS — E53.8 ADENOSYLCOBALAMIN SYNTHESIS DEFECT: ICD-10-CM

## 2021-01-28 DIAGNOSIS — Z12.5 SCREENING FOR MALIGNANT NEOPLASM OF PROSTATE: ICD-10-CM

## 2021-01-28 DIAGNOSIS — E55.9 VITAMIN D DEFICIENCY: ICD-10-CM

## 2021-01-28 LAB
ALBUMIN SERPL-MCNC: 3.8 G/DL (ref 3.4–5)
ALBUMIN/GLOB SERPL: 1.1 {RATIO} (ref 1–2)
ALP LIVER SERPL-CCNC: 69 U/L
ALT SERPL-CCNC: 25 U/L
ANION GAP SERPL CALC-SCNC: 2 MMOL/L (ref 0–18)
AST SERPL-CCNC: 10 U/L (ref 15–37)
BASOPHILS # BLD AUTO: 0.03 X10(3) UL (ref 0–0.2)
BASOPHILS NFR BLD AUTO: 0.8 %
BILIRUB SERPL-MCNC: 0.4 MG/DL (ref 0.1–2)
BILIRUB UR QL STRIP.AUTO: NEGATIVE
BUN BLD-MCNC: 14 MG/DL (ref 7–18)
BUN/CREAT SERPL: 12.5 (ref 10–20)
CALCIUM BLD-MCNC: 9 MG/DL (ref 8.5–10.1)
CHLORIDE SERPL-SCNC: 106 MMOL/L (ref 98–112)
CHOLEST SMN-MCNC: 228 MG/DL (ref ?–200)
CK SERPL-CCNC: 143 U/L
CLARITY UR REFRACT.AUTO: CLEAR
CO2 SERPL-SCNC: 30 MMOL/L (ref 21–32)
COLOR UR AUTO: YELLOW
COMPLEXED PSA SERPL-MCNC: 0.84 NG/ML (ref ?–4)
CREAT BLD-MCNC: 1.12 MG/DL
DEPRECATED HBV CORE AB SER IA-ACNC: 33.6 NG/ML
DEPRECATED RDW RBC AUTO: 48.1 FL (ref 35.1–46.3)
EOSINOPHIL # BLD AUTO: 0.09 X10(3) UL (ref 0–0.7)
EOSINOPHIL NFR BLD AUTO: 2.3 %
ERYTHROCYTE [DISTWIDTH] IN BLOOD BY AUTOMATED COUNT: 13.3 % (ref 11–15)
GLOBULIN PLAS-MCNC: 3.4 G/DL (ref 2.8–4.4)
GLUCOSE BLD-MCNC: 101 MG/DL (ref 70–99)
GLUCOSE UR STRIP.AUTO-MCNC: NEGATIVE MG/DL
HCT VFR BLD AUTO: 44.8 %
HDLC SERPL-MCNC: 64 MG/DL (ref 40–59)
HGB BLD-MCNC: 14.1 G/DL
IMM GRANULOCYTES # BLD AUTO: 0.01 X10(3) UL (ref 0–1)
IMM GRANULOCYTES NFR BLD: 0.3 %
IRON SATURATION: 23 %
IRON SERPL-MCNC: 84 UG/DL
KETONES UR STRIP.AUTO-MCNC: NEGATIVE MG/DL
LDLC SERPL CALC-MCNC: 141 MG/DL (ref ?–100)
LEUKOCYTE ESTERASE UR QL STRIP.AUTO: NEGATIVE
LYMPHOCYTES # BLD AUTO: 1.83 X10(3) UL (ref 1–4)
LYMPHOCYTES NFR BLD AUTO: 45.8 %
M PROTEIN MFR SERPL ELPH: 7.2 G/DL (ref 6.4–8.2)
MCH RBC QN AUTO: 30.8 PG (ref 26–34)
MCHC RBC AUTO-ENTMCNC: 31.5 G/DL (ref 31–37)
MCV RBC AUTO: 97.8 FL
MONOCYTES # BLD AUTO: 0.4 X10(3) UL (ref 0.1–1)
MONOCYTES NFR BLD AUTO: 10 %
NEUTROPHILS # BLD AUTO: 1.64 X10 (3) UL (ref 1.5–7.7)
NEUTROPHILS # BLD AUTO: 1.64 X10(3) UL (ref 1.5–7.7)
NEUTROPHILS NFR BLD AUTO: 40.8 %
NITRITE UR QL STRIP.AUTO: NEGATIVE
NONHDLC SERPL-MCNC: 164 MG/DL (ref ?–130)
OSMOLALITY SERPL CALC.SUM OF ELEC: 287 MOSM/KG (ref 275–295)
PATIENT FASTING Y/N/NP: YES
PATIENT FASTING Y/N/NP: YES
PH UR STRIP.AUTO: 6 [PH] (ref 4.5–8)
PLATELET # BLD AUTO: 236 10(3)UL (ref 150–450)
POTASSIUM SERPL-SCNC: 4.1 MMOL/L (ref 3.5–5.1)
PROT UR STRIP.AUTO-MCNC: NEGATIVE MG/DL
RBC # BLD AUTO: 4.58 X10(6)UL
RBC UR QL AUTO: NEGATIVE
SODIUM SERPL-SCNC: 138 MMOL/L (ref 136–145)
SP GR UR STRIP.AUTO: 1.01 (ref 1–1.03)
TOTAL IRON BINDING CAPACITY: 361 UG/DL (ref 240–450)
TRANSFERRIN SERPL-MCNC: 242 MG/DL (ref 200–360)
TRIGL SERPL-MCNC: 116 MG/DL (ref 30–149)
TSI SER-ACNC: 0.86 MIU/ML (ref 0.36–3.74)
URATE SERPL-MCNC: 4.6 MG/DL
UROBILINOGEN UR STRIP.AUTO-MCNC: <2 MG/DL
VIT B12 SERPL-MCNC: 616 PG/ML (ref 193–986)
VIT D+METAB SERPL-MCNC: 49.4 NG/ML (ref 30–100)
VLDLC SERPL CALC-MCNC: 23 MG/DL (ref 0–30)
WBC # BLD AUTO: 4 X10(3) UL (ref 4–11)

## 2021-01-28 PROCEDURE — 80050 GENERAL HEALTH PANEL: CPT | Performed by: FAMILY MEDICINE

## 2021-01-28 PROCEDURE — 81003 URINALYSIS AUTO W/O SCOPE: CPT | Performed by: FAMILY MEDICINE

## 2021-01-28 PROCEDURE — 82607 VITAMIN B-12: CPT | Performed by: FAMILY MEDICINE

## 2021-01-28 PROCEDURE — 82550 ASSAY OF CK (CPK): CPT | Performed by: FAMILY MEDICINE

## 2021-01-28 PROCEDURE — 84550 ASSAY OF BLOOD/URIC ACID: CPT | Performed by: FAMILY MEDICINE

## 2021-01-28 PROCEDURE — 83540 ASSAY OF IRON: CPT | Performed by: FAMILY MEDICINE

## 2021-01-28 PROCEDURE — 83550 IRON BINDING TEST: CPT | Performed by: FAMILY MEDICINE

## 2021-01-28 PROCEDURE — 82306 VITAMIN D 25 HYDROXY: CPT | Performed by: FAMILY MEDICINE

## 2021-01-28 PROCEDURE — 36415 COLL VENOUS BLD VENIPUNCTURE: CPT | Performed by: FAMILY MEDICINE

## 2021-01-28 PROCEDURE — 84153 ASSAY OF PSA TOTAL: CPT | Performed by: FAMILY MEDICINE

## 2021-01-28 PROCEDURE — 82728 ASSAY OF FERRITIN: CPT | Performed by: FAMILY MEDICINE

## 2021-01-28 PROCEDURE — 80061 LIPID PANEL: CPT | Performed by: FAMILY MEDICINE

## 2021-02-01 ENCOUNTER — TELEPHONE (OUTPATIENT)
Dept: FAMILY MEDICINE CLINIC | Facility: CLINIC | Age: 56
End: 2021-02-01

## 2021-02-01 DIAGNOSIS — E78.5 DYSLIPIDEMIA: Primary | ICD-10-CM

## 2021-02-01 NOTE — TELEPHONE ENCOUNTER
Patient notified of lab results and Dr. Nieves Tomlinson instructions. Will call back to schedule lab test.     6 month f/u labs pended for signature.

## 2021-02-01 NOTE — TELEPHONE ENCOUNTER
----- Message from Su Dia MD sent at 2/1/2021  7:32 AM CST -----  Cholesterol is too high.    Cholesterols are not all to goal.    In order to lower risk of cardiovascular disease the following lifestyle modifications are recommended: Decrease sugar,

## 2021-02-23 ENCOUNTER — PATIENT MESSAGE (OUTPATIENT)
Dept: FAMILY MEDICINE CLINIC | Facility: CLINIC | Age: 56
End: 2021-02-23

## 2021-02-24 NOTE — TELEPHONE ENCOUNTER
From: Lida Thompson  To: Jinny Quiñonez MD  Sent: 2/23/2021 6:10 PM CST  Subject: Other    Hello Doc,    1. The clonazepam seemed to improve sleep for about the first 2 weeks. I am back to work and using the doxepin.  I'll continue to use the doxepin unt

## 2021-03-12 DIAGNOSIS — Z23 NEED FOR VACCINATION: ICD-10-CM

## 2021-07-12 RX ORDER — TAMSULOSIN HYDROCHLORIDE 0.4 MG/1
CAPSULE ORAL
Qty: 30 CAPSULE | Refills: 0 | Status: SHIPPED | OUTPATIENT
Start: 2021-07-12 | End: 2021-08-16

## 2021-07-12 NOTE — TELEPHONE ENCOUNTER
Future appt:     Your appointments     Date & Time Appointment Department [facility])    Aug 23, 2021  2:20 PM CDT Presurgical Visit with José Miguel Sylvester MD [address] [place] [address], [facility] ([facility]            Yeison Martinezgo

## 2021-07-21 ENCOUNTER — OFFICE VISIT (OUTPATIENT)
Dept: FAMILY MEDICINE CLINIC | Facility: CLINIC | Age: 56
End: 2021-07-21
Payer: COMMERCIAL

## 2021-07-21 ENCOUNTER — TELEPHONE (OUTPATIENT)
Dept: FAMILY MEDICINE CLINIC | Facility: CLINIC | Age: 56
End: 2021-07-21

## 2021-07-21 VITALS
TEMPERATURE: 97 F | SYSTOLIC BLOOD PRESSURE: 118 MMHG | OXYGEN SATURATION: 99 % | RESPIRATION RATE: 16 BRPM | WEIGHT: 213.38 LBS | DIASTOLIC BLOOD PRESSURE: 76 MMHG | HEART RATE: 65 BPM | HEIGHT: 67 IN | BODY MASS INDEX: 33.49 KG/M2

## 2021-07-21 DIAGNOSIS — G47.09 OTHER INSOMNIA: Primary | ICD-10-CM

## 2021-07-21 PROCEDURE — 3074F SYST BP LT 130 MM HG: CPT | Performed by: NURSE PRACTITIONER

## 2021-07-21 PROCEDURE — 3078F DIAST BP <80 MM HG: CPT | Performed by: NURSE PRACTITIONER

## 2021-07-21 PROCEDURE — 3008F BODY MASS INDEX DOCD: CPT | Performed by: NURSE PRACTITIONER

## 2021-07-21 PROCEDURE — 99213 OFFICE O/P EST LOW 20 MIN: CPT | Performed by: NURSE PRACTITIONER

## 2021-07-21 RX ORDER — DOXEPIN HYDROCHLORIDE 10 MG/1
30 CAPSULE ORAL NIGHTLY
Refills: 0 | COMMUNITY
Start: 2021-07-21

## 2021-07-21 RX ORDER — ALPRAZOLAM 0.5 MG/1
0.5 TABLET ORAL NIGHTLY PRN
Qty: 30 TABLET | Refills: 0 | Status: SHIPPED | OUTPATIENT
Start: 2021-07-21 | End: 2021-08-26

## 2021-07-21 RX ORDER — DOXEPIN HYDROCHLORIDE 10 MG/ML
10 SOLUTION ORAL NIGHTLY
COMMUNITY
End: 2021-07-21

## 2021-07-21 NOTE — TELEPHONE ENCOUNTER
Consulted with Dr. Monica Laura. Recommends to do the increase in the doxepin, and Xanax 0.5 mg at bedtime as needed, and do CBI therapy. This may be beneficial this time secondary to his not having varying work schedules with being able to be on leave.     Anastasiya Weeks

## 2021-07-21 NOTE — TELEPHONE ENCOUNTER
Re:  was seen today - Would like a Rx for sleep aid  - Call into [facility] [place]  - Please call when done

## 2021-07-21 NOTE — PATIENT INSTRUCTIONS
Increase Doxepin to 30 mg nightly. Call when knows date of leave - will give other medication to help sleep. Keep pre-op scheduled with Dr. Carly Cook. Otherwise follow up as needed.

## 2021-07-21 NOTE — PROGRESS NOTES
HPI:    Patient ID: Alexandra Gabriel is a 61year old male. HPI     Patient is present for insomnia. Is taking doxepin 10 mg  up to 2 pills. Takes 2 - 3 hours to fall asleep. Wakes up throughout the night. Wakes groggy.    Has done the CBTi a few years well-developed. Comments: Appears tired   Cardiovascular:      Rate and Rhythm: Normal rate and regular rhythm. Pulses: Normal pulses. Heart sounds: Normal heart sounds.    Pulmonary:      Effort: Pulmonary effort is normal.      Breath sound

## 2021-07-23 ENCOUNTER — TELEPHONE (OUTPATIENT)
Dept: FAMILY MEDICINE CLINIC | Facility: CLINIC | Age: 56
End: 2021-07-23

## 2021-07-23 ENCOUNTER — TELEMEDICINE (OUTPATIENT)
Dept: FAMILY MEDICINE CLINIC | Facility: CLINIC | Age: 56
End: 2021-07-23
Payer: COMMERCIAL

## 2021-07-23 DIAGNOSIS — Z78.9 DIFFICULTY USING CONTINUOUS POSITIVE AIRWAY PRESSURE (CPAP) NASAL MASK: ICD-10-CM

## 2021-07-23 DIAGNOSIS — G47.33 OSA (OBSTRUCTIVE SLEEP APNEA): Primary | ICD-10-CM

## 2021-07-23 PROCEDURE — 99214 OFFICE O/P EST MOD 30 MIN: CPT | Performed by: FAMILY MEDICINE

## 2021-07-23 NOTE — PROGRESS NOTES
[facility]        HPI:   This is a 61year old male coming in for Patient presents with:  Obstructive Sleep Apnea (CRYSTAL): he has no data because he has not been using his pap machine, states it keeps him awake      HP RDI (/hr) - 12.3   PLM INDEX (/hr) - 0.8/hour    Facility [facility]   Sleep EFFC (%) - 91.4%   TOT Sleep TM (min) - 389 minutes       Lab Results   Component Value Date    IRON 84 01/28/2021    TRANSFERRIN 242 01/28/2021    TIBCP 361 01/28/ (30 mg total) by mouth nightly. 0   • ALPRAZolam (XANAX) 0.5 MG Oral Tab Take 1 tablet (0.5 mg total) by mouth nightly as needed.  30 tablet 0   • tamsulosin (FLOMAX) cap TAKE ONE CAPSULE BY MOUTH DAILY  30 capsule 0   • loratadine 10 MG Oral Tab Take 10 m Encounters:  07/21/21 : 118/76  01/21/21 : 118/80  01/04/21 : 112/68    Patient weight not recorded    Vital signs reviewed. Physical Exam  Constitutional:       General: He is not in acute distress. Appearance: He is well-developed.    HENT:      Head the machine have the possibility of breaking down. Using an ozone  such as Soclean, can increase this possibility and likelihood or particles in the air you are breathing. Stop using any Ozone  immediately.  Refer to  recommendat risk of driving accidents. Advised to refrain from driving when sleepy. COMPLIANCE is required by insurance for 4 hours a night most nights of the week.   Recommend weight loss, and maintain and optimal BMI with Exercise 30 minutes most days of the wee any questions regarding this note.  \"     Sonja Almendarez MD  7/23/2021  3:18 PM

## 2021-07-23 NOTE — PATIENT INSTRUCTIONS
You have been scheduled for a split night sleep study and CPAP titration at [facility]. Please  Call the sleep center at  to review forms and receive forms to fill out in the mail.      The sleep center will pre-certify your st issued a place to register the need for replacement/ repair at the following web site:       [email or web address] aasm. [id number]    There is a phon

## 2021-07-23 NOTE — TELEPHONE ENCOUNTER
Maximilian Victoria  verbally consents to a Virtual/Telephone Check-In service on 7/23/2021. Patient understands and accepts financial responsibility for any deductible, co-insurance and/or co-pays associated with this service.       Future Appointments

## 2021-07-26 ENCOUNTER — TELEPHONE (OUTPATIENT)
Dept: FAMILY MEDICINE CLINIC | Facility: CLINIC | Age: 56
End: 2021-07-26

## 2021-07-26 NOTE — TELEPHONE ENCOUNTER
Patient had a fit-for-work certification from his workplace which he turned in last week sometime. Please advise whether it is in process or needs to be resubmitted.

## 2021-08-16 ENCOUNTER — PATIENT MESSAGE (OUTPATIENT)
Dept: FAMILY MEDICINE CLINIC | Facility: CLINIC | Age: 56
End: 2021-08-16

## 2021-08-16 RX ORDER — TAMSULOSIN HYDROCHLORIDE 0.4 MG/1
0.4 CAPSULE ORAL DAILY
Qty: 30 CAPSULE | Refills: 11 | Status: SHIPPED | OUTPATIENT
Start: 2021-08-16

## 2021-08-16 NOTE — TELEPHONE ENCOUNTER
From: Betzy Mills. To: Amy Sosa MD  Sent: 8/16/2021 2:15 PM CDT  Subject: Prescription Question    Hi Doc,    I am almost out of tamsulosin hci .4 mg. May I have a refill? [facility] [place].     Thanks,  Janina Coronado

## 2021-08-16 NOTE — TELEPHONE ENCOUNTER
Please let patient know that the xanax should not increase the blood pressure. Recommend an appointment to discuss.

## 2021-08-16 NOTE — TELEPHONE ENCOUNTER
From: Chaz Teixeira. To: RADHA Fatima  Sent: 8/16/2021 1:08 PM CDT  Subject: Prescription Question    Miguel Barragan,  A couple of months ago you prescribed to me xanax for insomnia.  Since I've been taking it my blood pressure went from normal t

## 2021-08-16 NOTE — TELEPHONE ENCOUNTER
Future appt:     Your appointments     Date & Time Appointment Department [facility])    Aug 17, 2021  1:30 PM CDT Exam - Established with Chaka Reyes, [address] [place] [address], Kirti Fowler ([place])        Aug 19, 2021

## 2021-08-17 ENCOUNTER — OFFICE VISIT (OUTPATIENT)
Dept: FAMILY MEDICINE CLINIC | Facility: CLINIC | Age: 56
End: 2021-08-17
Payer: COMMERCIAL

## 2021-08-17 VITALS
OXYGEN SATURATION: 97 % | TEMPERATURE: 97 F | HEIGHT: 67 IN | DIASTOLIC BLOOD PRESSURE: 80 MMHG | WEIGHT: 207 LBS | SYSTOLIC BLOOD PRESSURE: 122 MMHG | HEART RATE: 59 BPM | RESPIRATION RATE: 18 BRPM | BODY MASS INDEX: 32.49 KG/M2

## 2021-08-17 DIAGNOSIS — G47.09 OTHER INSOMNIA: ICD-10-CM

## 2021-08-17 DIAGNOSIS — R39.12 BENIGN PROSTATIC HYPERPLASIA WITH WEAK URINARY STREAM: ICD-10-CM

## 2021-08-17 DIAGNOSIS — N40.1 BENIGN PROSTATIC HYPERPLASIA WITH WEAK URINARY STREAM: ICD-10-CM

## 2021-08-17 DIAGNOSIS — R03.0 ELEVATED BLOOD PRESSURE READING IN OFFICE WITHOUT DIAGNOSIS OF HYPERTENSION: Primary | ICD-10-CM

## 2021-08-17 PROCEDURE — 3079F DIAST BP 80-89 MM HG: CPT | Performed by: NURSE PRACTITIONER

## 2021-08-17 PROCEDURE — 99213 OFFICE O/P EST LOW 20 MIN: CPT | Performed by: NURSE PRACTITIONER

## 2021-08-17 PROCEDURE — 3074F SYST BP LT 130 MM HG: CPT | Performed by: NURSE PRACTITIONER

## 2021-08-17 PROCEDURE — 3008F BODY MASS INDEX DOCD: CPT | Performed by: NURSE PRACTITIONER

## 2021-08-17 NOTE — PROGRESS NOTES
HPI:    Patient ID: Sayra Lindo. is a 61year old male. HPI     Patient is present for a recheck on his blood pressure. States that his blood pressure was in the 140s when he was at [facility].   He started checking his blood pressure at home then wit needed     • B Complex-C-Folic Acid (VITALINE BIOTIN FORTE) 0.8 MG Oral Tab Take 1 tablet by mouth daily.      • Cholecalciferol (VITAMIN D) 1000 units Oral Tab      • omega-3 fatty acids 1000 MG Oral Cap        Allergies:No Known Allergies   PHYSICAL EXAM:

## 2021-08-19 ENCOUNTER — OFFICE VISIT (OUTPATIENT)
Dept: SLEEP CENTER | Age: 56
End: 2021-08-19
Attending: FAMILY MEDICINE
Payer: COMMERCIAL

## 2021-08-19 DIAGNOSIS — G47.33 OSA (OBSTRUCTIVE SLEEP APNEA): ICD-10-CM

## 2021-08-19 DIAGNOSIS — Z78.9 DIFFICULTY USING CONTINUOUS POSITIVE AIRWAY PRESSURE (CPAP) NASAL MASK: ICD-10-CM

## 2021-08-19 PROCEDURE — 95811 POLYSOM 6/>YRS CPAP 4/> PARM: CPT

## 2021-08-23 ENCOUNTER — OFFICE VISIT (OUTPATIENT)
Dept: FAMILY MEDICINE CLINIC | Facility: CLINIC | Age: 56
End: 2021-08-23
Payer: COMMERCIAL

## 2021-08-23 VITALS
SYSTOLIC BLOOD PRESSURE: 108 MMHG | WEIGHT: 203 LBS | RESPIRATION RATE: 18 BRPM | TEMPERATURE: 98 F | OXYGEN SATURATION: 96 % | BODY MASS INDEX: 31.86 KG/M2 | DIASTOLIC BLOOD PRESSURE: 72 MMHG | HEART RATE: 59 BPM | HEIGHT: 67 IN

## 2021-08-23 DIAGNOSIS — G47.33 OSA (OBSTRUCTIVE SLEEP APNEA): ICD-10-CM

## 2021-08-23 DIAGNOSIS — Z01.818 PREOP EXAMINATION: Primary | ICD-10-CM

## 2021-08-23 PROCEDURE — 99214 OFFICE O/P EST MOD 30 MIN: CPT | Performed by: FAMILY MEDICINE

## 2021-08-23 PROCEDURE — 3008F BODY MASS INDEX DOCD: CPT | Performed by: FAMILY MEDICINE

## 2021-08-23 PROCEDURE — 3078F DIAST BP <80 MM HG: CPT | Performed by: FAMILY MEDICINE

## 2021-08-23 PROCEDURE — 93000 ELECTROCARDIOGRAM COMPLETE: CPT | Performed by: FAMILY MEDICINE

## 2021-08-23 PROCEDURE — 3074F SYST BP LT 130 MM HG: CPT | Performed by: FAMILY MEDICINE

## 2021-08-23 NOTE — PROGRESS NOTES
[facility] [place]  PROGRESS NOTE        HPI:   This is a 61year old male coming in for Patient presents with:  Pre-Op Exam    HPI  Patient is having a Knee evaluation on Wednesday and plans to shave down his knee anterior tuberosity, from his Result Value Ref Range    Iron 84 65 - 175 ug/dL    Transferrin 242 200 - 360 mg/dL    Total Iron Binding Capacity 361 240 - 450 ug/dL    % Saturation 23 20 - 50 %   TSH W REFLEX TO FREE T4   Result Value Ref Range    TSH 0.863 0.358 - 3.740 mIU/mL   URI Basophil % 0.8 %    Immature Granulocyte % 0.3 %       Past Medical History:   Diagnosis Date   • Allergic rhinitis 7/19/2012   • Arthralgia of temporomandibular joint 8/1/2016   • Asthma    • Cervicalgia 1/27/2014   • Low back pain 5/28/2011   • Other breakfast. 90 capsule 0   • famotidine 40 MG Oral Tab Take 40 mg by mouth daily.      • Albuterol Sulfate  (90 Base) MCG/ACT Inhalation Aero Soln 1-2 puffs every 4 to 6 hrs as needed     • B Complex-C-Folic Acid (VITALINE BIOTIN FORTE) 0.8 MG Oral Ta reviewed. Physical Exam  Constitutional:       Appearance: Normal appearance. He is well-developed. HENT:      Head: Normocephalic and atraumatic.       Right Ear: Ear canal and external ear normal.      Left Ear: Tympanic membrane, ear canal and externa Prescriptions      No prescriptions requested or ordered in this encounter       Outcome: Parent verbalizes understanding. Parent is notified to call with any questions, complications, allergies, or worsening or changing symptoms.   Parent is to call with a

## 2021-08-24 ENCOUNTER — SLEEP STUDY (OUTPATIENT)
Dept: FAMILY MEDICINE CLINIC | Facility: CLINIC | Age: 56
End: 2021-08-24

## 2021-08-24 DIAGNOSIS — G47.33 OSA (OBSTRUCTIVE SLEEP APNEA): Primary | ICD-10-CM

## 2021-08-24 PROCEDURE — 95811 POLYSOM 6/>YRS CPAP 4/> PARM: CPT | Performed by: FAMILY MEDICINE

## 2021-08-26 ENCOUNTER — TELEPHONE (OUTPATIENT)
Dept: FAMILY MEDICINE CLINIC | Facility: CLINIC | Age: 56
End: 2021-08-26

## 2021-08-26 ENCOUNTER — LABORATORY ENCOUNTER (OUTPATIENT)
Dept: LAB | Age: 56
End: 2021-08-26
Attending: FAMILY MEDICINE
Payer: COMMERCIAL

## 2021-08-26 DIAGNOSIS — Z01.818 PREOP EXAMINATION: ICD-10-CM

## 2021-08-26 DIAGNOSIS — E78.5 DYSLIPIDEMIA: ICD-10-CM

## 2021-08-26 LAB
ALBUMIN SERPL-MCNC: 3.7 G/DL (ref 3.4–5)
ALBUMIN/GLOB SERPL: 1 {RATIO} (ref 1–2)
ALP LIVER SERPL-CCNC: 67 U/L
ALT SERPL-CCNC: 19 U/L
ANION GAP SERPL CALC-SCNC: 4 MMOL/L (ref 0–18)
AST SERPL-CCNC: 18 U/L (ref 15–37)
BASOPHILS # BLD AUTO: 0.03 X10(3) UL (ref 0–0.2)
BASOPHILS NFR BLD AUTO: 0.5 %
BILIRUB SERPL-MCNC: 0.3 MG/DL (ref 0.1–2)
BUN BLD-MCNC: 19 MG/DL (ref 7–18)
CALCIUM BLD-MCNC: 8.5 MG/DL (ref 8.5–10.1)
CHLORIDE SERPL-SCNC: 108 MMOL/L (ref 98–112)
CHOLEST SMN-MCNC: 222 MG/DL (ref ?–200)
CK SERPL-CCNC: 214 U/L
CO2 SERPL-SCNC: 27 MMOL/L (ref 21–32)
CREAT BLD-MCNC: 1.12 MG/DL
EOSINOPHIL # BLD AUTO: 0.1 X10(3) UL (ref 0–0.7)
EOSINOPHIL NFR BLD AUTO: 1.7 %
ERYTHROCYTE [DISTWIDTH] IN BLOOD BY AUTOMATED COUNT: 13.4 %
GLOBULIN PLAS-MCNC: 3.8 G/DL (ref 2.8–4.4)
GLUCOSE BLD-MCNC: 84 MG/DL (ref 70–99)
HCT VFR BLD AUTO: 45.1 %
HDLC SERPL-MCNC: 64 MG/DL (ref 40–59)
HGB BLD-MCNC: 13.7 G/DL
IMM GRANULOCYTES # BLD AUTO: 0.01 X10(3) UL (ref 0–1)
IMM GRANULOCYTES NFR BLD: 0.2 %
LDLC SERPL CALC-MCNC: 141 MG/DL (ref ?–100)
LYMPHOCYTES # BLD AUTO: 3.11 X10(3) UL (ref 1–4)
LYMPHOCYTES NFR BLD AUTO: 53.9 %
M PROTEIN MFR SERPL ELPH: 7.5 G/DL (ref 6.4–8.2)
MCH RBC QN AUTO: 30.2 PG (ref 26–34)
MCHC RBC AUTO-ENTMCNC: 30.4 G/DL (ref 31–37)
MCV RBC AUTO: 99.3 FL
MONOCYTES # BLD AUTO: 0.56 X10(3) UL (ref 0.1–1)
MONOCYTES NFR BLD AUTO: 9.7 %
NEUTROPHILS # BLD AUTO: 1.96 X10 (3) UL (ref 1.5–7.7)
NEUTROPHILS # BLD AUTO: 1.96 X10(3) UL (ref 1.5–7.7)
NEUTROPHILS NFR BLD AUTO: 34 %
NONHDLC SERPL-MCNC: 158 MG/DL (ref ?–130)
OSMOLALITY SERPL CALC.SUM OF ELEC: 289 MOSM/KG (ref 275–295)
PATIENT FASTING Y/N/NP: YES
PATIENT FASTING Y/N/NP: YES
PLATELET # BLD AUTO: 248 10(3)UL (ref 150–450)
POTASSIUM SERPL-SCNC: 4.2 MMOL/L (ref 3.5–5.1)
RBC # BLD AUTO: 4.54 X10(6)UL
SODIUM SERPL-SCNC: 139 MMOL/L (ref 136–145)
TRIGL SERPL-MCNC: 94 MG/DL (ref 30–149)
VLDLC SERPL CALC-MCNC: 17 MG/DL (ref 0–30)
WBC # BLD AUTO: 5.8 X10(3) UL (ref 4–11)

## 2021-08-26 PROCEDURE — 80053 COMPREHEN METABOLIC PANEL: CPT | Performed by: FAMILY MEDICINE

## 2021-08-26 PROCEDURE — 85025 COMPLETE CBC W/AUTO DIFF WBC: CPT | Performed by: FAMILY MEDICINE

## 2021-08-26 PROCEDURE — 82550 ASSAY OF CK (CPK): CPT | Performed by: FAMILY MEDICINE

## 2021-08-26 PROCEDURE — 80061 LIPID PANEL: CPT | Performed by: FAMILY MEDICINE

## 2021-08-26 RX ORDER — ALPRAZOLAM 0.5 MG/1
TABLET ORAL
Qty: 30 TABLET | Refills: 0 | Status: SHIPPED | OUTPATIENT
Start: 2021-08-26 | End: 2021-10-20

## 2021-08-26 NOTE — PROGRESS NOTES
[place] PAP at 8  Update Flow sheet. Notify pt to:   Call DME supplier to obtain machine. Recommend routine follow up to assure compliance and efficacy.   Avoid alcohol, sedatives and other cns depressants that may worsen sleep apnea and disrupt cherelle

## 2021-08-26 NOTE — TELEPHONE ENCOUNTER
Future appt:    Last Appointment with provider:   7/21/21 sleep follow up  Last appointment at [facility]:  8/23/2021  Cholesterol, Total (mg/dL)   Date Value   01/28/2021 228 (H)     HDL Cholesterol (mg/dL)   Date Value   01/28/2021 64 (H)     LDL Cholesterol (mg/dL)   Date Value   01/28/2021 141 (H)     Triglycerides (mg/dL)   Date Value   01/28/2021 116     No results found for: EAG, A1C  Lab Results   Component Value Date    TSH 0.863 01/28/2021       No follow-ups on file.

## 2021-08-27 ENCOUNTER — TELEPHONE (OUTPATIENT)
Dept: FAMILY MEDICINE CLINIC | Facility: CLINIC | Age: 56
End: 2021-08-27

## 2021-08-27 DIAGNOSIS — E78.5 DYSLIPIDEMIA: Primary | ICD-10-CM

## 2021-08-27 NOTE — TELEPHONE ENCOUNTER
Patient viewed physician message on TapMyBack. Future labs entered as ordered below. Preop labs faxed as directed.

## 2021-08-27 NOTE — TELEPHONE ENCOUNTER
----- Message from Radha Tyler MD sent at 8/26/2021  5:14 PM CDT -----  Cholesterols are not all to goal.    In order to lower risk of cardiovascular disease the following lifestyle modifications are recommended: Decrease sugar, simple carbohydrates, and

## 2021-10-19 NOTE — TELEPHONE ENCOUNTER
Please verify that patient is using his CPAP. He had a study in August and has not had a follow up since. Refill pending.

## 2021-10-19 NOTE — TELEPHONE ENCOUNTER
Future appt:    Last Appointment with provider:  8/17/21 BP and medication follow up. - follow up as needed   Last appointment at [facility]:  8/23/2021  Cholesterol, Total (mg/dL)   Date Value   08/26/2021 222 (H)     HDL Cholesterol (mg/dL)   Date Value

## 2021-10-20 RX ORDER — ALPRAZOLAM 0.5 MG/1
TABLET ORAL
Qty: 30 TABLET | Refills: 0 | Status: SHIPPED | OUTPATIENT
Start: 2021-10-20 | End: 2021-12-17

## 2021-12-17 RX ORDER — ALPRAZOLAM 0.5 MG/1
TABLET ORAL
Qty: 30 TABLET | Refills: 1 | Status: SHIPPED | OUTPATIENT
Start: 2021-12-17 | End: 2022-02-03

## 2021-12-17 NOTE — TELEPHONE ENCOUNTER
Future appt:     Your appointments     Date & Time Appointment Department [facility])    Jan 24, 2022 10:30 AM CST Adult Physical with Maryanne Nolasco, [address], [place] ([place] [facility])    PLEASE NOTE - Most ins

## 2022-02-02 ENCOUNTER — TELEPHONE (OUTPATIENT)
Dept: FAMILY MEDICINE CLINIC | Facility: CLINIC | Age: 57
End: 2022-02-02

## 2022-02-02 NOTE — TELEPHONE ENCOUNTER
PX tomorrow      answered YES to COVID question  !) Runny nose       please triage      Future Appointments   Date Time Provider Kori Kruger   2/3/2022 10:30 AM RADHA Hines [facility] [place] [facility]

## 2022-02-02 NOTE — TELEPHONE ENCOUNTER
I have spoke to pt and he states that he does not have any other sxs of covid- states that the runny nose is due to weather-    Pt was tested for covid over a month ago because his wife was feeling ill and she was positive and he was negative- he never had any sxs. Pt is fine to come into the office.

## 2022-02-03 ENCOUNTER — OFFICE VISIT (OUTPATIENT)
Dept: FAMILY MEDICINE CLINIC | Facility: CLINIC | Age: 57
End: 2022-02-03
Payer: COMMERCIAL

## 2022-02-03 ENCOUNTER — LAB ENCOUNTER (OUTPATIENT)
Dept: LAB | Age: 57
End: 2022-02-03
Attending: NURSE PRACTITIONER
Payer: COMMERCIAL

## 2022-02-03 VITALS
HEIGHT: 67 IN | OXYGEN SATURATION: 99 % | TEMPERATURE: 98 F | SYSTOLIC BLOOD PRESSURE: 114 MMHG | RESPIRATION RATE: 18 BRPM | DIASTOLIC BLOOD PRESSURE: 66 MMHG | HEART RATE: 59 BPM | BODY MASS INDEX: 33.27 KG/M2 | WEIGHT: 212 LBS

## 2022-02-03 DIAGNOSIS — E78.5 DYSLIPIDEMIA: ICD-10-CM

## 2022-02-03 DIAGNOSIS — Z00.00 ENCOUNTER FOR HEALTH MAINTENANCE EXAMINATION IN ADULT: Primary | ICD-10-CM

## 2022-02-03 DIAGNOSIS — R39.12 BENIGN PROSTATIC HYPERPLASIA WITH WEAK URINARY STREAM: ICD-10-CM

## 2022-02-03 DIAGNOSIS — N40.1 BENIGN PROSTATIC HYPERPLASIA WITH WEAK URINARY STREAM: ICD-10-CM

## 2022-02-03 DIAGNOSIS — Z12.5 PROSTATE CANCER SCREENING: ICD-10-CM

## 2022-02-03 DIAGNOSIS — Z23 NEED FOR VACCINATION: ICD-10-CM

## 2022-02-03 LAB
ALBUMIN/GLOB SERPL: 1.1 {RATIO} (ref 1–2)
ALP LIVER SERPL-CCNC: 86 U/L
ALT SERPL-CCNC: 25 U/L
ANION GAP SERPL CALC-SCNC: 8 MMOL/L (ref 0–18)
AST SERPL-CCNC: 21 U/L (ref 15–37)
BILIRUB SERPL-MCNC: 0.6 MG/DL (ref 0.1–2)
BUN BLD-MCNC: 11 MG/DL (ref 7–18)
CALCIUM BLD-MCNC: 9.5 MG/DL (ref 8.5–10.1)
CHLORIDE SERPL-SCNC: 105 MMOL/L (ref 98–112)
CHOLEST SERPL-MCNC: 275 MG/DL (ref ?–200)
CK SERPL-CCNC: 200 U/L
CO2 SERPL-SCNC: 25 MMOL/L (ref 21–32)
COMPLEXED PSA SERPL-MCNC: 0.67 NG/ML (ref ?–4)
CREAT BLD-MCNC: 1.09 MG/DL
FASTING PATIENT LIPID ANSWER: YES
FASTING STATUS PATIENT QL REPORTED: YES
GLOBULIN PLAS-MCNC: 3.7 G/DL (ref 2.8–4.4)
GLUCOSE BLD-MCNC: 96 MG/DL (ref 70–99)
HDLC SERPL-MCNC: 75 MG/DL (ref 40–59)
LDLC SERPL CALC-MCNC: 184 MG/DL (ref ?–100)
NONHDLC SERPL-MCNC: 200 MG/DL (ref ?–130)
OSMOLALITY SERPL CALC.SUM OF ELEC: 285 MOSM/KG (ref 275–295)
POTASSIUM SERPL-SCNC: 4.4 MMOL/L (ref 3.5–5.1)
PROT SERPL-MCNC: 7.8 G/DL (ref 6.4–8.2)
SODIUM SERPL-SCNC: 138 MMOL/L (ref 136–145)
TRIGL SERPL-MCNC: 96 MG/DL (ref 30–149)
VLDLC SERPL CALC-MCNC: 20 MG/DL (ref 0–30)

## 2022-02-03 PROCEDURE — 80061 LIPID PANEL: CPT | Performed by: FAMILY MEDICINE

## 2022-02-03 PROCEDURE — 3008F BODY MASS INDEX DOCD: CPT | Performed by: NURSE PRACTITIONER

## 2022-02-03 PROCEDURE — 82550 ASSAY OF CK (CPK): CPT | Performed by: FAMILY MEDICINE

## 2022-02-03 PROCEDURE — 3074F SYST BP LT 130 MM HG: CPT | Performed by: NURSE PRACTITIONER

## 2022-02-03 PROCEDURE — 3078F DIAST BP <80 MM HG: CPT | Performed by: NURSE PRACTITIONER

## 2022-02-03 PROCEDURE — 99396 PREV VISIT EST AGE 40-64: CPT | Performed by: NURSE PRACTITIONER

## 2022-02-03 PROCEDURE — 90750 HZV VACC RECOMBINANT IM: CPT | Performed by: NURSE PRACTITIONER

## 2022-02-03 PROCEDURE — 80053 COMPREHEN METABOLIC PANEL: CPT | Performed by: FAMILY MEDICINE

## 2022-02-03 PROCEDURE — 84153 ASSAY OF PSA TOTAL: CPT | Performed by: NURSE PRACTITIONER

## 2022-02-03 PROCEDURE — 90471 IMMUNIZATION ADMIN: CPT | Performed by: NURSE PRACTITIONER

## 2022-02-03 RX ORDER — DEXAMETHASONE 4 MG/1
1 TABLET ORAL 2 TIMES DAILY
Qty: 1 EACH | Refills: 3 | Status: SHIPPED | OUTPATIENT
Start: 2022-02-03 | End: 2023-01-29

## 2022-02-03 RX ORDER — ALPRAZOLAM 0.5 MG/1
0.5 TABLET ORAL NIGHTLY PRN
Qty: 30 TABLET | Refills: 1 | Status: SHIPPED | OUTPATIENT
Start: 2022-02-03

## 2022-02-03 NOTE — PATIENT INSTRUCTIONS
Recommend Benadryl 25 mg a night  - if not helpful try 50 mg -  Recommend using your C-pap    cologuard  - will send in an order     Albuterol as needed. Shingles vaccine today-  Follow up in 2 given 2- 6 months apart. ( April- August)    Follow up with Urologist -    Next time you get ill - I would recommend using Flovent inhaler 2 puffs twice a day (rinse you mouth after use)-  Do this for about 2-4 weeks to help prevent inflammation in your lungs secondary to the infection   You can also take albuterol inhaler every 4-6 hrs as needed during this time.      Fasting blood work today

## 2022-02-04 ENCOUNTER — TELEPHONE (OUTPATIENT)
Dept: FAMILY MEDICINE CLINIC | Facility: CLINIC | Age: 57
End: 2022-02-04

## 2022-02-04 NOTE — TELEPHONE ENCOUNTER
----- Message from RADHA Cardenas sent at 2/4/2022 12:28 PM CST -----  Please make sure patient receives my chart message as below-  Your blood work shows that your cholesterol is high at 275-your good cholesterol is very good at 75, but your bad cholesterol is high at 184, triglycerides are okay at 96. Recommend a diet high in vegetables and lean proteins low in carbohydrates and sugars and saturated fats, recommend regular exercise, fish oil supplements, and a goal of waist circumference of 40 inches or less at the [facility]. Your CMP is normal-normal liver and kidney function normal blood sugar. See also PSA which is normal.Thank you,Norma Baird, FNP-BC

## 2022-02-06 NOTE — TELEPHONE ENCOUNTER
The PSA was normal-  If he would like to stop taking the biotin and ask the urologist if he would like to repeat the PSA that would be fine.

## 2022-04-01 NOTE — TELEPHONE ENCOUNTER
Patient had reported at 2/3/22 physical with EL that he is not taking Doxepin, but reports now he does take it, just not every night, and would like refill. Also, pended med is for 20mg nightly. Med list had read 30mg nightly. Future appt:    Last Appointment with provider:   8/23/21 for pre-op exam.  Last appointment at [facility] [place] [place]:  2/3/2022 with EL for annual physical.  Cholesterol, Total (mg/dL)   Date Value   02/03/2022 275 (H)     HDL Cholesterol (mg/dL)   Date Value   02/03/2022 75 (H)     LDL Cholesterol (mg/dL)   Date Value   02/03/2022 184 (H)     Triglycerides (mg/dL)   Date Value   02/03/2022 96     No results found for: EAG, A1C  Lab Results   Component Value Date    TSH 0.863 01/28/2021       No follow-ups on file.

## 2022-04-04 RX ORDER — DOXEPIN HYDROCHLORIDE 10 MG/1
20 CAPSULE ORAL NIGHTLY PRN
Qty: 180 CAPSULE | Refills: 0 | Status: SHIPPED | OUTPATIENT
Start: 2022-04-04

## 2022-04-12 NOTE — TELEPHONE ENCOUNTER
Future Appointments   Date Time Provider Kori Kruger   4/25/2022  1:30 PM Rashid Olivares APRN [facility] [place]   4/25/2022  2:30 PM [facility] NURSE [facility] [place]

## 2022-04-29 ENCOUNTER — TELEPHONE (OUTPATIENT)
Dept: FAMILY MEDICINE CLINIC | Facility: CLINIC | Age: 57
End: 2022-04-29

## 2022-04-29 NOTE — TELEPHONE ENCOUNTER
Recommend a home Covid test. If negative and doesn't get worse over the weekend, ok for in office appointment. If worsens over the weekend, go to urgent care or the ER or schedule an appointment with respiratory clinic on Monday.  Will evaluate if the Shingles vaccine appropriate at the appointment

## 2022-04-29 NOTE — TELEPHONE ENCOUNTER
Patient had onset of cold symptoms on 4/24/22: runny nose, cough, sneezing. These symptoms have resolved, but he still has some feelings of shortness of breath he attributes to his asthma. Not severe enough he has used rescue inhaler; feels his scheduled steroid inhaler is sufficient for his symptoms. No fever. Please advise if ok for CRYSTAL appointment and shingles vaccine on 5/2/22.

## 2022-04-29 NOTE — TELEPHONE ENCOUNTER
Patient prefers to cancel appointment for now. Will reschedule at a later time. Did not want to take a home covid test at all.

## 2022-04-29 NOTE — TELEPHONE ENCOUNTER
said yes to Shortness of breath on covid screeing. said had cold earlier this week.  thinks from his asthma after having cold

## 2022-05-12 ENCOUNTER — OFFICE VISIT (OUTPATIENT)
Dept: FAMILY MEDICINE CLINIC | Facility: CLINIC | Age: 57
End: 2022-05-12
Payer: COMMERCIAL

## 2022-05-12 VITALS
OXYGEN SATURATION: 98 % | RESPIRATION RATE: 18 BRPM | SYSTOLIC BLOOD PRESSURE: 120 MMHG | BODY MASS INDEX: 31.71 KG/M2 | DIASTOLIC BLOOD PRESSURE: 76 MMHG | HEIGHT: 68 IN | TEMPERATURE: 98 F | WEIGHT: 209.19 LBS | HEART RATE: 68 BPM

## 2022-05-12 DIAGNOSIS — G47.09 OTHER INSOMNIA: ICD-10-CM

## 2022-05-12 DIAGNOSIS — G47.33 OSA (OBSTRUCTIVE SLEEP APNEA): Primary | ICD-10-CM

## 2022-05-12 PROCEDURE — 90750 HZV VACC RECOMBINANT IM: CPT | Performed by: NURSE PRACTITIONER

## 2022-05-12 PROCEDURE — 3008F BODY MASS INDEX DOCD: CPT | Performed by: NURSE PRACTITIONER

## 2022-05-12 PROCEDURE — 3078F DIAST BP <80 MM HG: CPT | Performed by: NURSE PRACTITIONER

## 2022-05-12 PROCEDURE — 99213 OFFICE O/P EST LOW 20 MIN: CPT | Performed by: NURSE PRACTITIONER

## 2022-05-12 PROCEDURE — 90471 IMMUNIZATION ADMIN: CPT | Performed by: NURSE PRACTITIONER

## 2022-05-12 PROCEDURE — 3074F SYST BP LT 130 MM HG: CPT | Performed by: NURSE PRACTITIONER

## 2022-05-12 RX ORDER — TEMAZEPAM 7.5 MG/1
7.5 CAPSULE ORAL NIGHTLY PRN
Qty: 30 CAPSULE | Refills: 0 | Status: SHIPPED | OUTPATIENT
Start: 2022-05-12

## 2022-05-12 RX ORDER — SILODOSIN 8 MG/1
8 CAPSULE ORAL DAILY
COMMUNITY
Start: 2022-04-08

## 2022-05-12 RX ORDER — AMPICILLIN TRIHYDRATE 250 MG
500 CAPSULE ORAL DAILY
COMMUNITY

## 2022-05-12 NOTE — PATIENT INSTRUCTIONS
Given a trial of temazepam with the understanding that this could increase his risk of CVA, MI, arrhythmias, and dying. Do not to take with alcohol or any other sleep aids. Work on sleep hygiene. Follow up in 3 weeks with an update - sooner if needed.

## 2022-06-14 RX ORDER — TEMAZEPAM 7.5 MG/1
CAPSULE ORAL
Qty: 90 CAPSULE | Refills: 0 | Status: SHIPPED | OUTPATIENT
Start: 2022-06-14

## 2022-06-14 NOTE — TELEPHONE ENCOUNTER
Future appt:   Last Appointment with provider:   5/12/2022 CRYSTAL  Last appointment at [facility]:  5/12/2022  Cholesterol, Total (mg/dL)   Date Value   02/03/2022 275 (H)     HDL Cholesterol (mg/dL)   Date Value   02/03/2022 75 (H)     LDL Cholesterol (mg/dL)   Date Value   02/03/2022 184 (H)     Triglycerides (mg/dL)   Date Value   02/03/2022 96     No results found for: EAG, A1C  Lab Results   Component Value Date    TSH 0.863 01/28/2021       No follow-ups on file.

## 2022-06-14 NOTE — TELEPHONE ENCOUNTER
pt wants to let provider know that he will be traveling for the next month and needs 2 months worth of temazepam- jhoana maher

## 2022-06-27 DIAGNOSIS — G47.09 OTHER INSOMNIA: ICD-10-CM

## 2022-06-27 RX ORDER — DOXEPIN HYDROCHLORIDE 10 MG/1
20 CAPSULE ORAL NIGHTLY PRN
Qty: 180 CAPSULE | Refills: 0 | Status: SHIPPED | OUTPATIENT
Start: 2022-06-27

## 2022-06-27 NOTE — TELEPHONE ENCOUNTER
Future appt:    Last Appointment with provider:   5/12/2022 CRYSTAL   Last appointment at [facility]:  5/12/2022  Cholesterol, Total (mg/dL)   Date Value   02/03/2022 275 (H)     HDL Cholesterol (mg/dL)   Date Value   02/03/2022 75 (H)     LDL Cholesterol (mg/dL)   Date Value   02/03/2022 184 (H)     Triglycerides (mg/dL)   Date Value   02/03/2022 96     No results found for: EAG, A1C  Lab Results   Component Value Date    TSH 0.863 01/28/2021       No follow-ups on file.

## 2022-08-02 NOTE — TELEPHONE ENCOUNTER
Future appt:    Last Appointment with provider:   5/12/2022  CRYSTAL  Last appointment at [facility]:  5/12/2022  Cholesterol, Total (mg/dL)   Date Value   02/03/2022 275 (H)     HDL Cholesterol (mg/dL)   Date Value   02/03/2022 75 (H)     LDL Cholesterol (mg/dL)   Date Value   02/03/2022 184 (H)     Triglycerides (mg/dL)   Date Value   02/03/2022 96     No results found for: EAG, A1C  Lab Results   Component Value Date    TSH 0.863 01/28/2021       No follow-ups on file.

## 2022-08-03 RX ORDER — TEMAZEPAM 7.5 MG/1
CAPSULE ORAL
Qty: 30 CAPSULE | Refills: 0 | OUTPATIENT
Start: 2022-08-03

## 2022-08-03 NOTE — TELEPHONE ENCOUNTER
Please have him schedule an appointment with either La[facility] or Dr. Ashwin Morris in regards to this medication, sleep, and CRYSTAL. Patient has CRYSTAL, and this medication could be detrimental to his health. Thank you.

## 2022-08-09 RX ORDER — TEMAZEPAM 7.5 MG/1
7.5 CAPSULE ORAL NIGHTLY PRN
Qty: 90 CAPSULE | Refills: 0 | Status: SHIPPED | OUTPATIENT
Start: 2022-08-09

## 2022-08-09 NOTE — TELEPHONE ENCOUNTER
Future Appointments   Date Time Provider Kori Kruger   8/17/2022  3:00 PM Francisca Olivares APRN [facility]

## 2022-08-12 ENCOUNTER — TELEPHONE (OUTPATIENT)
Dept: FAMILY MEDICINE CLINIC | Facility: CLINIC | Age: 57
End: 2022-08-12

## 2022-08-12 NOTE — TELEPHONE ENCOUNTER
pt submitted a  medical records request to have a copy of his sleep study from 2010, 2017, and 2021. These were printed and given to pt.

## 2022-09-29 ENCOUNTER — OFFICE VISIT (OUTPATIENT)
Dept: FAMILY MEDICINE CLINIC | Facility: CLINIC | Age: 57
End: 2022-09-29

## 2022-09-29 VITALS
HEART RATE: 71 BPM | WEIGHT: 208 LBS | SYSTOLIC BLOOD PRESSURE: 128 MMHG | RESPIRATION RATE: 14 BRPM | TEMPERATURE: 98 F | DIASTOLIC BLOOD PRESSURE: 78 MMHG | BODY MASS INDEX: 31.52 KG/M2 | HEIGHT: 68 IN | OXYGEN SATURATION: 97 %

## 2022-09-29 DIAGNOSIS — L72.0 EPIDERMOID CYST: Primary | ICD-10-CM

## 2022-09-29 PROCEDURE — 3078F DIAST BP <80 MM HG: CPT

## 2022-09-29 PROCEDURE — 99213 OFFICE O/P EST LOW 20 MIN: CPT

## 2022-09-29 PROCEDURE — 3074F SYST BP LT 130 MM HG: CPT

## 2022-09-29 PROCEDURE — 3008F BODY MASS INDEX DOCD: CPT

## 2022-09-29 RX ORDER — OXYBUTYNIN CHLORIDE 5 MG/1
5 TABLET, EXTENDED RELEASE ORAL DAILY
COMMUNITY
Start: 2022-06-20

## 2022-09-29 RX ORDER — TAMSULOSIN HYDROCHLORIDE 0.4 MG/1
0.4 CAPSULE ORAL DAILY
COMMUNITY

## 2022-10-18 ENCOUNTER — TELEMEDICINE (OUTPATIENT)
Dept: FAMILY MEDICINE CLINIC | Facility: CLINIC | Age: 57
End: 2022-10-18
Payer: COMMERCIAL

## 2022-10-18 VITALS — WEIGHT: 207 LBS | HEIGHT: 68 IN | BODY MASS INDEX: 31.37 KG/M2

## 2022-10-18 DIAGNOSIS — J01.40 ACUTE NON-RECURRENT PANSINUSITIS: Primary | ICD-10-CM

## 2022-10-18 DIAGNOSIS — R06.2 WHEEZING: ICD-10-CM

## 2022-10-18 PROCEDURE — 99213 OFFICE O/P EST LOW 20 MIN: CPT | Performed by: NURSE PRACTITIONER

## 2022-10-18 RX ORDER — ALBUTEROL SULFATE 90 UG/1
2 AEROSOL, METERED RESPIRATORY (INHALATION) EVERY 4 HOURS PRN
Qty: 1 EACH | Refills: 0 | Status: SHIPPED | OUTPATIENT
Start: 2022-10-18

## 2022-10-18 RX ORDER — AMOXICILLIN AND CLAVULANATE POTASSIUM 875; 125 MG/1; MG/1
1 TABLET, FILM COATED ORAL 2 TIMES DAILY
Qty: 20 TABLET | Refills: 0 | Status: SHIPPED | OUTPATIENT
Start: 2022-10-18 | End: 2022-10-28

## 2022-10-18 RX ORDER — ALBUTEROL SULFATE 90 UG/1
2 AEROSOL, METERED RESPIRATORY (INHALATION) EVERY 4 HOURS PRN
Qty: 1 EACH | Refills: 0 | Status: SHIPPED | OUTPATIENT
Start: 2022-10-18 | End: 2022-10-18

## 2022-10-18 NOTE — PATIENT INSTRUCTIONS
Augmentin 1 tablet twice a day for ten days; take with food  Use albuterol every 6 hours while awake today then use if needed for wheezing  If no improvement or worsening then in office evaluation

## 2022-11-14 NOTE — TELEPHONE ENCOUNTER
----- Message from Sergio Castillo MD sent at 8/26/2021  8:12 AM CDT -----  [place] PAP at 8  Update Flow sheet. Notify pt to:   Call DME supplier to obtain machine. Recommend routine follow up to assure compliance and efficacy.   Avoid alcohol, sedativ
Flowsheet updated. Packet faxed to [facility].
Per Dr. Didier Lopez- She spoke with him regarding sleep study results at office visit 8/23/21
Moderate to severe acute illness with or without fever. Delay administration of vaccination until patient has been afebrile or illness resolved for 24hrs

## 2022-12-29 ENCOUNTER — PATIENT MESSAGE (OUTPATIENT)
Dept: FAMILY MEDICINE CLINIC | Facility: CLINIC | Age: 57
End: 2022-12-29

## 2022-12-29 NOTE — TELEPHONE ENCOUNTER
From: Tanisha Sage. To: RADHA Sosa  Sent: 12/29/2022 1:40 PM CST  Subject: psychologist referral for Rosamaria Keen,  My daughter Suman Gardner had an interview with a psychologist regarding social security disability benefits. I don't know if she will be receiving any. The psychologist did recommend she seek treatment from a psychologist, especially since Suman Gardner is very much against taking meds. In order to make an appointment with a psychologist I was told that a referral was needed. Could you give us a referral for her? If so, what do we have to do to get it?   Thanks,  Alfa Maldonado

## 2022-12-29 NOTE — TELEPHONE ENCOUNTER
Please forward this message to Corinne's chart. Also need to verify that we can speak to Amina Malia was 8/2021.  Will need appointment for referral.

## 2023-02-07 ENCOUNTER — OFFICE VISIT (OUTPATIENT)
Dept: FAMILY MEDICINE CLINIC | Facility: CLINIC | Age: 58
End: 2023-02-07
Payer: COMMERCIAL

## 2023-02-07 VITALS
SYSTOLIC BLOOD PRESSURE: 134 MMHG | HEIGHT: 68 IN | TEMPERATURE: 97 F | RESPIRATION RATE: 16 BRPM | BODY MASS INDEX: 31.37 KG/M2 | WEIGHT: 207 LBS | DIASTOLIC BLOOD PRESSURE: 78 MMHG | HEART RATE: 69 BPM | OXYGEN SATURATION: 96 %

## 2023-02-07 DIAGNOSIS — Z00.00 WELLNESS EXAMINATION: Primary | ICD-10-CM

## 2023-02-07 DIAGNOSIS — E55.9 VITAMIN D DEFICIENCY: ICD-10-CM

## 2023-02-07 DIAGNOSIS — Z12.5 SCREENING FOR MALIGNANT NEOPLASM OF PROSTATE: ICD-10-CM

## 2023-02-07 DIAGNOSIS — I51.7 CARDIOMEGALY: ICD-10-CM

## 2023-02-07 DIAGNOSIS — E78.9 DISORDER OF LIPID METABOLISM: ICD-10-CM

## 2023-02-07 PROBLEM — M92.521 OSGOOD-SCHLATTER'S DISEASE OF RIGHT LOWER EXTREMITY: Status: ACTIVE | Noted: 2021-08-25

## 2023-02-07 PROBLEM — M76.50 PATELLAR TENDINOSIS: Status: ACTIVE | Noted: 2021-08-25

## 2023-02-07 PROCEDURE — 3075F SYST BP GE 130 - 139MM HG: CPT | Performed by: FAMILY MEDICINE

## 2023-02-07 PROCEDURE — 3008F BODY MASS INDEX DOCD: CPT | Performed by: FAMILY MEDICINE

## 2023-02-07 PROCEDURE — 90471 IMMUNIZATION ADMIN: CPT | Performed by: FAMILY MEDICINE

## 2023-02-07 PROCEDURE — 90677 PCV20 VACCINE IM: CPT | Performed by: FAMILY MEDICINE

## 2023-02-07 PROCEDURE — 99396 PREV VISIT EST AGE 40-64: CPT | Performed by: FAMILY MEDICINE

## 2023-02-07 PROCEDURE — 3078F DIAST BP <80 MM HG: CPT | Performed by: FAMILY MEDICINE

## 2023-02-10 ENCOUNTER — LABORATORY ENCOUNTER (OUTPATIENT)
Dept: LAB | Age: 58
End: 2023-02-10
Attending: FAMILY MEDICINE
Payer: COMMERCIAL

## 2023-02-10 DIAGNOSIS — I51.7 CARDIOMEGALY: ICD-10-CM

## 2023-02-10 DIAGNOSIS — E78.9 DISORDER OF LIPID METABOLISM: ICD-10-CM

## 2023-02-10 DIAGNOSIS — Z12.5 SCREENING FOR MALIGNANT NEOPLASM OF PROSTATE: ICD-10-CM

## 2023-02-10 DIAGNOSIS — E55.9 VITAMIN D DEFICIENCY: ICD-10-CM

## 2023-02-10 LAB
ALBUMIN SERPL-MCNC: 3.7 G/DL (ref 3.4–5)
ALBUMIN/GLOB SERPL: 0.9 {RATIO} (ref 1–2)
ALP LIVER SERPL-CCNC: 69 U/L
ALT SERPL-CCNC: 25 U/L
ANION GAP SERPL CALC-SCNC: 2 MMOL/L (ref 0–18)
AST SERPL-CCNC: 27 U/L (ref 15–37)
BASOPHILS # BLD AUTO: 0.02 X10(3) UL (ref 0–0.2)
BASOPHILS NFR BLD AUTO: 0.5 %
BILIRUB SERPL-MCNC: 0.5 MG/DL (ref 0.1–2)
BILIRUB UR QL STRIP.AUTO: NEGATIVE
BUN BLD-MCNC: 14 MG/DL (ref 7–18)
CALCIUM BLD-MCNC: 9.1 MG/DL (ref 8.5–10.1)
CHLORIDE SERPL-SCNC: 106 MMOL/L (ref 98–112)
CHOLEST SERPL-MCNC: 226 MG/DL (ref ?–200)
CK SERPL-CCNC: 274 U/L
CLARITY UR REFRACT.AUTO: CLEAR
CO2 SERPL-SCNC: 31 MMOL/L (ref 21–32)
COMPLEXED PSA SERPL-MCNC: 0.63 NG/ML (ref ?–4)
CREAT BLD-MCNC: 1.18 MG/DL
DEPRECATED HBV CORE AB SER IA-ACNC: 8 NG/ML
EOSINOPHIL # BLD AUTO: 0.13 X10(3) UL (ref 0–0.7)
EOSINOPHIL NFR BLD AUTO: 3.5 %
ERYTHROCYTE [DISTWIDTH] IN BLOOD BY AUTOMATED COUNT: 16.1 %
FASTING PATIENT LIPID ANSWER: YES
FASTING STATUS PATIENT QL REPORTED: YES
GFR SERPLBLD BASED ON 1.73 SQ M-ARVRAT: 72 ML/MIN/1.73M2 (ref 60–?)
GLOBULIN PLAS-MCNC: 4 G/DL (ref 2.8–4.4)
GLUCOSE BLD-MCNC: 108 MG/DL (ref 70–99)
GLUCOSE UR STRIP.AUTO-MCNC: NEGATIVE MG/DL
HCT VFR BLD AUTO: 40.4 %
HDLC SERPL-MCNC: 73 MG/DL (ref 40–59)
HGB BLD-MCNC: 12.6 G/DL
IMM GRANULOCYTES # BLD AUTO: 0 X10(3) UL (ref 0–1)
IMM GRANULOCYTES NFR BLD: 0 %
IRON SATN MFR SERPL: 25 %
IRON SERPL-MCNC: 119 UG/DL
KETONES UR STRIP.AUTO-MCNC: NEGATIVE MG/DL
LDLC SERPL CALC-MCNC: 136 MG/DL (ref ?–100)
LEUKOCYTE ESTERASE UR QL STRIP.AUTO: NEGATIVE
LYMPHOCYTES # BLD AUTO: 1.31 X10(3) UL (ref 1–4)
LYMPHOCYTES NFR BLD AUTO: 35.5 %
MCH RBC QN AUTO: 28.3 PG (ref 26–34)
MCHC RBC AUTO-ENTMCNC: 31.2 G/DL (ref 31–37)
MCV RBC AUTO: 90.6 FL
MONOCYTES # BLD AUTO: 0.42 X10(3) UL (ref 0.1–1)
MONOCYTES NFR BLD AUTO: 11.4 %
NEUTROPHILS # BLD AUTO: 1.81 X10 (3) UL (ref 1.5–7.7)
NEUTROPHILS # BLD AUTO: 1.81 X10(3) UL (ref 1.5–7.7)
NEUTROPHILS NFR BLD AUTO: 49.1 %
NITRITE UR QL STRIP.AUTO: NEGATIVE
NONHDLC SERPL-MCNC: 153 MG/DL (ref ?–130)
OSMOLALITY SERPL CALC.SUM OF ELEC: 289 MOSM/KG (ref 275–295)
PH UR STRIP.AUTO: 9 [PH] (ref 5–8)
PLATELET # BLD AUTO: 251 10(3)UL (ref 150–450)
POTASSIUM SERPL-SCNC: 4.6 MMOL/L (ref 3.5–5.1)
PROT SERPL-MCNC: 7.7 G/DL (ref 6.4–8.2)
PROT UR STRIP.AUTO-MCNC: NEGATIVE MG/DL
RBC # BLD AUTO: 4.46 X10(6)UL
RBC UR QL AUTO: NEGATIVE
SODIUM SERPL-SCNC: 139 MMOL/L (ref 136–145)
SP GR UR STRIP.AUTO: 1 (ref 1–1.03)
TIBC SERPL-MCNC: 475 UG/DL (ref 240–450)
TRANSFERRIN SERPL-MCNC: 319 MG/DL (ref 200–360)
TRIGL SERPL-MCNC: 98 MG/DL (ref 30–149)
TSI SER-ACNC: 0.83 MIU/ML (ref 0.36–3.74)
URATE SERPL-MCNC: 6.2 MG/DL
UROBILINOGEN UR STRIP.AUTO-MCNC: <2 MG/DL
VIT B12 SERPL-MCNC: 687 PG/ML (ref 193–986)
VIT D+METAB SERPL-MCNC: 47.3 NG/ML (ref 30–100)
VLDLC SERPL CALC-MCNC: 18 MG/DL (ref 0–30)
WBC # BLD AUTO: 3.7 X10(3) UL (ref 4–11)

## 2023-02-10 PROCEDURE — 82550 ASSAY OF CK (CPK): CPT | Performed by: FAMILY MEDICINE

## 2023-02-10 PROCEDURE — 81003 URINALYSIS AUTO W/O SCOPE: CPT | Performed by: FAMILY MEDICINE

## 2023-02-10 PROCEDURE — 82607 VITAMIN B-12: CPT | Performed by: FAMILY MEDICINE

## 2023-02-10 PROCEDURE — 83540 ASSAY OF IRON: CPT | Performed by: FAMILY MEDICINE

## 2023-02-10 PROCEDURE — 82306 VITAMIN D 25 HYDROXY: CPT | Performed by: FAMILY MEDICINE

## 2023-02-10 PROCEDURE — 82728 ASSAY OF FERRITIN: CPT | Performed by: FAMILY MEDICINE

## 2023-02-10 PROCEDURE — 80061 LIPID PANEL: CPT | Performed by: FAMILY MEDICINE

## 2023-02-10 PROCEDURE — 83550 IRON BINDING TEST: CPT | Performed by: FAMILY MEDICINE

## 2023-02-10 PROCEDURE — 84550 ASSAY OF BLOOD/URIC ACID: CPT | Performed by: FAMILY MEDICINE

## 2023-02-10 PROCEDURE — 80050 GENERAL HEALTH PANEL: CPT | Performed by: FAMILY MEDICINE

## 2023-02-10 PROCEDURE — 84153 ASSAY OF PSA TOTAL: CPT | Performed by: FAMILY MEDICINE

## 2023-02-13 ENCOUNTER — TELEPHONE (OUTPATIENT)
Dept: FAMILY MEDICINE CLINIC | Facility: CLINIC | Age: 58
End: 2023-02-13

## 2023-02-13 NOTE — TELEPHONE ENCOUNTER
Discussed with patient. He is willing to take iron plus c supplement and will complete FIT test upon return from over[place]the-road trip on 3/1/23. FIT test mailed. Patient would like to continue with weight loss, diet modification, and exercise to address cholesterol for now. Will be willing to revisit the statin idea in another year.

## 2023-03-14 ENCOUNTER — LAB ENCOUNTER (OUTPATIENT)
Dept: LAB | Age: 58
End: 2023-03-14
Attending: FAMILY MEDICINE
Payer: COMMERCIAL

## 2023-03-16 DIAGNOSIS — E61.1 LOW IRON: Primary | ICD-10-CM

## 2023-04-24 RX ORDER — TEMAZEPAM 7.5 MG/1
7.5 CAPSULE ORAL NIGHTLY PRN
Qty: 90 CAPSULE | Refills: 0 | Status: SHIPPED | OUTPATIENT
Start: 2023-04-24

## 2023-04-24 NOTE — TELEPHONE ENCOUNTER
Future appt:    Last Appointment with provider:   2/7/2023 for annual physical.  Last appointment at [facility]:  2/7/2023  Cholesterol, Total (mg/dL)   Date Value   02/10/2023 226 (H)     HDL Cholesterol (mg/dL)   Date Value   02/10/2023 73 (H)     LDL Cholesterol (mg/dL)   Date Value   02/10/2023 136 (H)     Triglycerides (mg/dL)   Date Value   02/10/2023 98     No results found for: EAG, A1C  Lab Results   Component Value Date    TSH 0.828 02/10/2023       No follow-ups on file.

## 2023-07-15 ENCOUNTER — PATIENT MESSAGE (OUTPATIENT)
Dept: FAMILY MEDICINE CLINIC | Facility: CLINIC | Age: 58
End: 2023-07-15

## 2023-07-17 NOTE — TELEPHONE ENCOUNTER
From: Silverio Monroe. Sent: 7/15/2023 9:52 AM CDT  To: Narendra Thomas Clinical Staff  Subject: contacting Stewart Morales. Please disregard my last message.
none

## 2023-08-04 DIAGNOSIS — G47.09 OTHER INSOMNIA: ICD-10-CM

## 2023-08-07 RX ORDER — DOXEPIN HYDROCHLORIDE 10 MG/1
20 CAPSULE ORAL NIGHTLY PRN
Qty: 180 CAPSULE | Refills: 0 | Status: SHIPPED | OUTPATIENT
Start: 2023-08-07

## 2023-08-07 NOTE — TELEPHONE ENCOUNTER
Dexepin: 6/27/22  No return date given. Future appt:    Last Appointment with provider:   Visit date not found  Last appointment at [facility] [place]:    2/7/2023 / PCP    Cholesterol, Total (mg/dL)   Date Value   02/10/2023 226 (H)     HDL Cholesterol (mg/dL)   Date Value   02/10/2023 73 (H)     LDL Cholesterol (mg/dL)   Date Value   02/10/2023 136 (H)     Triglycerides (mg/dL)   Date Value   02/10/2023 98     No results found for: EAG, A1C  Lab Results   Component Value Date    TSH 0.828 02/10/2023       No follow-ups on file.

## 2023-09-11 ENCOUNTER — OFFICE VISIT (OUTPATIENT)
Dept: FAMILY MEDICINE CLINIC | Facility: CLINIC | Age: 58
End: 2023-09-11
Payer: COMMERCIAL

## 2023-09-11 VITALS
HEIGHT: 68 IN | RESPIRATION RATE: 18 BRPM | OXYGEN SATURATION: 99 % | TEMPERATURE: 98 F | HEART RATE: 58 BPM | SYSTOLIC BLOOD PRESSURE: 124 MMHG | BODY MASS INDEX: 30.01 KG/M2 | DIASTOLIC BLOOD PRESSURE: 80 MMHG | WEIGHT: 198 LBS

## 2023-09-11 DIAGNOSIS — J40 SINOBRONCHITIS: ICD-10-CM

## 2023-09-11 DIAGNOSIS — J40 WHEEZY BRONCHITIS: Primary | ICD-10-CM

## 2023-09-11 DIAGNOSIS — R05.1 ACUTE COUGH: ICD-10-CM

## 2023-09-11 DIAGNOSIS — J32.9 SINOBRONCHITIS: ICD-10-CM

## 2023-09-11 PROCEDURE — 3079F DIAST BP 80-89 MM HG: CPT

## 2023-09-11 PROCEDURE — 3008F BODY MASS INDEX DOCD: CPT

## 2023-09-11 PROCEDURE — 99214 OFFICE O/P EST MOD 30 MIN: CPT

## 2023-09-11 PROCEDURE — 3074F SYST BP LT 130 MM HG: CPT

## 2023-09-11 PROCEDURE — 87637 SARSCOV2&INF A&B&RSV AMP PRB: CPT

## 2023-09-11 RX ORDER — AZITHROMYCIN 250 MG/1
TABLET, FILM COATED ORAL
Qty: 6 TABLET | Refills: 0 | Status: SHIPPED | OUTPATIENT
Start: 2023-09-11 | End: 2023-09-15

## 2023-09-11 RX ORDER — PREDNISONE 20 MG/1
20 TABLET ORAL DAILY
Qty: 10 TABLET | Refills: 0 | Status: SHIPPED | OUTPATIENT
Start: 2023-09-11 | End: 2023-09-21

## 2023-09-13 LAB
FLUAV + FLUBV RNA SPEC NAA+PROBE: NEGATIVE
FLUAV + FLUBV RNA SPEC NAA+PROBE: NEGATIVE
RSV RNA SPEC NAA+PROBE: NEGATIVE
SARS-COV-2 RNA RESP QL NAA+PROBE: DETECTED

## 2023-11-22 NOTE — PATIENT INSTRUCTIONS
"SUBJECTIVE:   Vincenzo is a 72 year old, presenting for the following:  Physical        Are you in the first 12 months of your Medicare coverage?  No    Healthy Habits:     In general, how would you rate your overall health?  Good    Frequency of exercise:  2-3 days/week    Duration of exercise:  45-60 minutes    Do you usually eat at least 4 servings of fruit and vegetables a day, include whole grains    & fiber and avoid regularly eating high fat or \"junk\" foods?  No    Taking medications regularly:  Yes    Medication side effects:  None    Ability to successfully perform activities of daily living:  No assistance needed    Home Safety:  Lack of grab bars in the bathroom    Hearing Impairment:  Difficulty following a conversation in a noisy restaurant or crowded room, difficulty following dialogue in the theater, difficult to understand a speaker at a public meeting or Mandaeism service, need to ask people to speak up or repeat themselves, difficulty understanding soft or whispered speech and difficulty understanding speech on the telephone    In the past 6 months, have you been bothered by leaking of urine? Yes    In general, how would you rate your overall mental or emotional health?  Good    Additional concerns today:  No          Have you ever done Advance Care Planning? (For example, a Health Directive, POLST, or a discussion with a medical provider or your loved ones about your wishes): Yes, patient states has an Advance Care Planning document and will bring a copy to the clinic.       Fall risk  Fallen 2 or more times in the past year?: No  Any fall with injury in the past year?: No  click delete button to remove this line now  Cognitive Screening   1) Repeat 3 items (Leader, Season, Table)    2) Clock draw: NORMAL  3) 3 item recall: Recalls 2 objects   Results: 3 items recalled: COGNITIVE IMPAIRMENT LESS LIKELY    Mini-CogTM Copyright S Gabino. Licensed by the author for use in [facility]; " Rest, increase fluids, salt water gargles ,neti pot (use distilled water) or saline nasal spray,Robitussin\Alavert, Tylenol, follow up if symptoms persist or increase. reprinted with permission (mily@.[facility]). All rights reserved.      Do you have sleep apnea, excessive snoring or daytime drowsiness? : no    Reviewed and updated as needed this visit by clinical staff                  Reviewed and updated as needed this visit by Provider                 Social History     Tobacco Use    Smoking status: Never    Smokeless tobacco: Never   Substance Use Topics    Alcohol use: Yes     Comment: one per night             11/15/2023     1:29 PM   Alcohol Use   Prescreen: >3 drinks/day or >7 drinks/week? Yes   AUDIT SCORE  5         11/15/2023     1:29 PM   AUDIT - Alcohol Use Disorders Identification Test - Reproduced from the World Health Organization Audit 2001 (Second Edition)   1.  How often do you have a drink containing alcohol? 4 or more times a week   2.  How many drinks containing alcohol do you have on a typical day when you are drinking? 1 or 2   3.  How often do you have five or more drinks on one occasion? Never   4.  How often during the last year have you found that you were not able to stop drinking once you had started? Never   5.  How often during the last year have you failed to do what was normally expected of you because of drinking? Never   6.  How often during the last year have you needed a first drink in the morning to get yourself going after a heavy drinking session? Never   7.  How often during the last year have you had a feeling of guilt or remorse after drinking? Less than monthly   8.  How often during the last year have you been unable to remember what happened the night before because of your drinking? Never   9.  Have you or someone else been injured because of your drinking? No   10. Has a relative, friend, doctor or other health care worker been concerned about your drinking or suggested you cut down? No   TOTAL SCORE 5     Do you have a current opioid prescription? No  Do you use any other controlled substances or medications that are not prescribed  by a provider? None          Current providers sharing in care for this patient include:   Patient Care Team:  Stephon Dai MD as PCP - General  CarolineCan MD as Assigned Heart and Vascular Provider    The following health maintenance items are reviewed in Epic and correct as of today:  Health Maintenance   Topic Date Due    ANNUAL REVIEW OF  ORDERS  Never done    ADVANCE CARE PLANNING  Never done    COLORECTAL CANCER SCREENING  Never done    HEPATITIS C SCREENING  Never done    LIPID  Never done    RSV VACCINE (Pregnancy & 60+) (1 - 1-dose 60+ series) Never done    PHQ-2 (once per calendar year)  Never done    MEDICARE ANNUAL WELLNESS VISIT  08/17/2023    INFLUENZA VACCINE (1) 09/01/2023    COVID-19 Vaccine (4 - 2023-24 season) 09/01/2023    FALL RISK ASSESSMENT  11/22/2024    DTAP/TDAP/TD IMMUNIZATION (5 - Td or Tdap) 08/20/2026    Pneumococcal Vaccine: 65+ Years  Completed    ZOSTER IMMUNIZATION  Completed    AORTIC ANEURYSM SCREENING (SYSTEM ASSIGNED)  Completed    IPV IMMUNIZATION  Aged Out    HPV IMMUNIZATION  Aged Out    MENINGITIS IMMUNIZATION  Aged Out    RSV MONOCLONAL ANTIBODY  Aged Out     Right upper quadrant abdominal pain   Vincenzo Moss has had right upper quadrant pain that has comes and goes.  No pain today.  Osteoarthritis knee   Vincenzo Moss has had occasional pain in the left knee also comes and goes  Foot calf pain   Vincenzo Moss had has occasional neuritis involving left foot.  No back pain at this time.   Has had symptoms over the past few months.  No changes in shoes.  No injujries.  Pain in the foot and shin.      Review of Systems   Constitutional:  Negative for chills and fever.   HENT:  Positive for hearing loss. Negative for congestion, ear pain and sore throat.    Eyes:  Positive for visual disturbance. Negative for pain.   Respiratory:  Positive for shortness of breath. Negative for cough.    Cardiovascular:  Negative for chest pain, palpitations  "and peripheral edema.   Gastrointestinal:  Negative for abdominal pain, constipation, diarrhea, heartburn, hematochezia and nausea.   Genitourinary:  Positive for frequency and urgency. Negative for dysuria, genital sores, hematuria, impotence and penile discharge.   Musculoskeletal:  Positive for arthralgias, joint swelling and myalgias.   Skin:  Negative for rash.   Neurological:  Negative for dizziness, weakness, headaches and paresthesias.   Psychiatric/Behavioral:  Positive for mood changes. The patient is nervous/anxious.        OBJECTIVE:   /78   Pulse 64   Temp 97.3  F (36.3  C) (Tympanic)   Resp 18   Ht 1.778 m (5' 10\")   Wt 105.6 kg (232 lb 12.8 oz)   SpO2 99%   BMI 33.40 kg/m   Estimated body mass index is 32.14 kg/m  as calculated from the following:    Height as of 12/7/22: 1.778 m (5' 10\").    Weight as of 12/7/22: 101.6 kg (224 lb).  Physical Exam  GENERAL: healthy, alert and no distress  EYES: Eyes grossly normal to inspection    HENT: ear canals and TM's normal, nose and mouth without ulcers or lesions  NECK: no adenopathy, no asymmetry, masses, or scars and thyroid normal to palpation  RESP: lungs clear to auscultation - no rales, rhonchi or wheezes  CV: regular rate and rhythm, normal S1 S2, no S3 or S4, no murmur, click or rub, no peripheral edema   ABDOMEN: soft, nontender, no hepatosplenomegaly,    MS: no gross musculoskeletal defects noted, full range of motion of bilateral lower extremities  SKIN: no suspicious lesions or rashes  NEURO: Normal strength and tone, mentation intact and speech normal  PSYCH: mentation appears normal, affect normal/bright    Diagnostic Test Results:  Labs reviewed in Epic    ASSESSMENT / PLAN:     Patient Instructions   (Z00.00) Routine general medical examination at a health care facility  (primary encounter diagnosis)  Comment: For routine exam, we will draw labs as ordered, cholesterol, diabetes mellitus check, liver function, renal function, PSA " "and update colonoscopy.  We will also update vaccination history.  Plan: Prostate Specific Antigen Screen, Lipid panel         reflex to direct LDL Fasting, Comprehensive         metabolic panel, CBC with platelets            (Z11.59) Need for hepatitis C screening test  Comment: Check Hepatitis C   Plan:     (I10) Essential hypertension, benign  Comment: Check hemoglobin A1c as well.  Continue blood pressure medications   Plan: Hemoglobin A1c            (R10.11) RUQ abdominal pain  Comment: REcommend abdominal ultrasound [facility] phone #[phone]   Plan: US Abdomen Limited            (M79.662) Pain of left calf  Comment: Recommend ultrasound to rule out deep vein thrombosis [facility] phone #[phone]   Plan: US Lower Extremity Venous Duplex Left                Patient has been advised of split billing requirements and indicates understanding: Yes      COUNSELING:  Reviewed preventive health counseling, as reflected in patient instructions      BMI:   Estimated body mass index is 32.14 kg/m  as calculated from the following:    Height as of 12/7/22: 1.778 m (5' 10\").    Weight as of 12/7/22: 101.6 kg (224 lb).         He reports that he has never smoked. He has never used smokeless tobacco.      Appropriate preventive services were discussed with this patient, including applicable screening as appropriate for fall prevention, nutrition, physical activity, Tobacco-use cessation, weight loss and cognition.  Checklist reviewing preventive services available has been given to the patient.    Reviewed patients plan of care and provided an AVS. The Basic Care Plan (routine screening as documented in Health Maintenance) for Vincenzo meets the Care Plan requirement. This Care Plan has been established and reviewed with the Patient.          Stephon Dai MD, MD  [facility]    Identified Health Risks:  I have reviewed Opioid Use Disorder and Substance Use Disorder risk " factors and made any needed referrals.

## (undated) DIAGNOSIS — G47.09 OTHER INSOMNIA: Primary | ICD-10-CM

## (undated) NOTE — LETTER
Patient Name: Maximilian Victoria  : 1965  MRN: [id number]  Patient Address: [address] [place] [address] Disease 2019 (COVID-19)     [facility] is committed to the safety and well-being of our patients, members, emp 2. Monitor your symptoms carefully. If your symptoms get worse, call your healthcare provider immediately. 3. Get rest and stay hydrated.    4. If you have a medical appointment, call the healthcare provider ahead of time and tell them that you have or may ? At least 24 hours have passed since recovery defined as resolution of fever without the use of fever-reducing medications; and  · Improvement in respiratory symptoms (e.g., cough, shortness of breath); and  · At least 10 days have passed since symptoms f If you would be interested in donating your plasma to help treat others diagnosed with the virus, please contact Natasha directly on their website: [email or web address]    Who is eligible to donate convalescent plasma?

## (undated) NOTE — MR AVS SNAPSHOT
[address]  [place] [address] [id number]  [phone]               Thank you for choosing us for your health care visit with KIRSTEN Martins.   We are glad to serve you and happy to provide you with this summary o © [id number] [address], [address] [place]. All rights reserved. This information is not intended as a substitute for professional medical care. Always follow your healthcare professional's instructions.         Wrist F 122/78 mmHg 60 97.6 °F (36.4 °C) (Tympanic) 67\" 210 lb 3.2 oz 32.91 kg/m2         Current Medications          This list is accurate as of: 2/27/17 10:58 AM.  Always use your most recent med list.                ACID CONTROL 150 MG Tabs   Generic drug: including white bread, rice and pasta   Eat plenty of protein, keep the fat content low Sugars:  sodas and sports drinks, candies and desserts   Eat plenty of low-fat dairy products High fat meats and dairy   Choose whole grain products Foods high in sodiu

## (undated) NOTE — LETTER
Date: 7/21/2021    Patient Name: Noemi Glasgow          To Whom it may concern: This letter has been written at the patient's request. The above patient was seen at the [facility] for treatment of a medical condition.     This patient s

## (undated) NOTE — LETTER
12/23/19  [address] [facility]      To whom it may concern,     Emil Khan was seen in our office this morning for an appointment    Sincerely,       Ramo Seo RN

## (undated) NOTE — LETTER
07/19/18        Kimber Kid  [address] [facility]      Dear Mona Hanna records indicate that you have outstanding lab work and or testing that was ordered for you and has not yet been completed:          Vitamin D, 25-Hydroxy

## (undated) NOTE — LETTER
01/16/21        Maximilian Victoria  [address] [facility]      Dear Brigitte Rodriguez records indicate that you have outstanding lab work and or testing that was ordered for you and has not yet been completed:  Orders Placed This Encounter

## (undated) NOTE — MR AVS SNAPSHOT
[address]  [place] [address] [id number]  [phone]               Thank you for choosing us for your health care visit with Elva Barcenas MD.  We are glad to serve you and happy to provide you with this summary of yo If you've recently had a stay at the Hospital you can access your discharge instructions in [facility] by going to Visits < Admission Summaries.  If you've been to the Emergency Department or your doctor's office, you can view your past visit information in My

## (undated) NOTE — LETTER
12/26/18        Siri Kuo  [address] [facility]      Dear Oneal Altamirano records indicate that you have outstanding lab work and or testing that was ordered for you and has not yet been completed:  Orders Placed This Encounter